# Patient Record
Sex: FEMALE | Race: WHITE
[De-identification: names, ages, dates, MRNs, and addresses within clinical notes are randomized per-mention and may not be internally consistent; named-entity substitution may affect disease eponyms.]

---

## 2023-06-21 ENCOUNTER — HOSPITAL ENCOUNTER
Dept: HOSPITAL 101 - LAB | Age: 83
End: 2023-06-21
Payer: MEDICARE

## 2023-06-21 DIAGNOSIS — R00.1: ICD-10-CM

## 2023-06-21 DIAGNOSIS — I10: ICD-10-CM

## 2023-06-21 DIAGNOSIS — D64.9: ICD-10-CM

## 2023-06-21 DIAGNOSIS — R06.02: Primary | ICD-10-CM

## 2023-06-21 DIAGNOSIS — I25.10: ICD-10-CM

## 2023-06-21 LAB
ADD MANUAL DIFF: NO
ANION GAP: 12.3
BLOOD UREA NITROGEN: 21 MG/DL (ref 7–18)
CALCIUM: 9.1 MG/DL (ref 8.5–10.1)
CARBON DIOXIDE: 29.4 MMOL/L (ref 21–32)
CHLORIDE: 105 MMOL/L (ref 98–107)
CO2 BLD-SCNC: 29.4 MMOL/L (ref 21–32)
ESTIMATED GFR (AFRICAN AMERICA: 53 (ref 60–?)
ESTIMATED GFR (NON-AFRICAN AME: 44 (ref 60–?)
GLUCOSE BLD-MCNC: 94 MG/DL (ref 74–106)
HCT VFR BLD CALC: 32 % (ref 36–48)
HEMATOCRIT: 32 % (ref 36–48)
HEMOGLOBIN: 10.6 G/DL (ref 12–16)
IMMATURE GRANULOCYTES ABS AUTO: 0.05 10^3/UL (ref 0–0.03)
IMMATURE GRANULOCYTES PCT AUTO: 0.9 % (ref 0–0.5)
LYMPHOCYTES  ABSOLUTE AUTO: 0.7 10^3/UL (ref 1.2–3.8)
MCV RBC: 89.4 FL (ref 81–99)
MEAN CORPUSCULAR HEMOGLOBIN: 29.6 PG (ref 26.7–34)
MEAN CORPUSCULAR HGB CONC: 33.1 G/DL (ref 29.9–35.2)
MEAN CORPUSCULAR VOLUME: 89.4 FL (ref 81–99)
NT PRO B TYPE NATRIURETIC PEPT: 746 PG/ML (ref ?–1800)
PLATELET # BLD: 314 10^3/UL (ref 150–450)
PLATELET COUNT: 314 10^3/UL (ref 150–450)
POTASSIUM SERPLBLD-SCNC: 4.7 MMOL/L (ref 3.5–5.1)
POTASSIUM: 4.7 MMOL/L (ref 3.5–5.1)
RED BLOOD COUNT: 3.58 10^6/UL (ref 4.2–5.4)
SODIUM BLD-SCNC: 142 MMOL/L (ref 136–145)
SODIUM: 142 MMOL/L (ref 136–145)
THYROID STIMULATING HORMONE: 4.32 UIU/ML (ref 0.36–3.74)
WBC # BLD: 5.7 10^3/UL (ref 4–11)
WHITE BLOOD COUNT: 5.7 10^3/UL (ref 4–11)

## 2023-06-21 PROCEDURE — 84443 ASSAY THYROID STIM HORMONE: CPT

## 2023-06-21 PROCEDURE — 80048 BASIC METABOLIC PNL TOTAL CA: CPT

## 2023-06-21 PROCEDURE — 83880 ASSAY OF NATRIURETIC PEPTIDE: CPT

## 2023-06-21 PROCEDURE — 85025 COMPLETE CBC W/AUTO DIFF WBC: CPT

## 2023-06-21 PROCEDURE — 71046 X-RAY EXAM CHEST 2 VIEWS: CPT

## 2023-06-21 PROCEDURE — 36415 COLL VENOUS BLD VENIPUNCTURE: CPT

## 2023-08-03 ENCOUNTER — HOSPITAL ENCOUNTER
Dept: HOSPITAL 101 - LAB | Age: 83
Discharge: HOME | End: 2023-08-03
Payer: MEDICARE

## 2023-08-03 DIAGNOSIS — I10: Primary | ICD-10-CM

## 2023-08-03 LAB
ANION GAP: 11.8
BLOOD UREA NITROGEN: 17 MG/DL (ref 7–18)
CALCIUM: 8.7 MG/DL (ref 8.5–10.1)
CARBON DIOXIDE: 27.7 MMOL/L (ref 21–32)
CHLORIDE: 105 MMOL/L (ref 98–107)
CO2 BLD-SCNC: 27.7 MMOL/L (ref 21–32)
ESTIMATED GFR (AFRICAN AMERICA: 58 (ref 60–?)
ESTIMATED GFR (NON-AFRICAN AME: 48 (ref 60–?)
GLUCOSE BLD-MCNC: 117 MG/DL (ref 74–106)
POTASSIUM SERPLBLD-SCNC: 4.5 MMOL/L (ref 3.5–5.1)
POTASSIUM: 4.5 MMOL/L (ref 3.5–5.1)
SODIUM BLD-SCNC: 140 MMOL/L (ref 136–145)
SODIUM: 140 MMOL/L (ref 136–145)

## 2023-08-03 PROCEDURE — 80048 BASIC METABOLIC PNL TOTAL CA: CPT

## 2023-08-03 PROCEDURE — 36415 COLL VENOUS BLD VENIPUNCTURE: CPT

## 2023-09-20 ENCOUNTER — HOSPITAL ENCOUNTER
Dept: HOSPITAL 101 - LAB | Age: 83
Discharge: HOME | End: 2023-09-20
Payer: MEDICARE

## 2023-09-20 DIAGNOSIS — R30.0: Primary | ICD-10-CM

## 2023-09-20 LAB
CAST SEEN?: (no result) #/LPF
GLUCOSE URINE UA: NEGATIVE MG/DL

## 2023-09-20 PROCEDURE — 81001 URINALYSIS AUTO W/SCOPE: CPT

## 2023-09-20 PROCEDURE — 87086 URINE CULTURE/COLONY COUNT: CPT

## 2023-09-29 ENCOUNTER — HOSPITAL ENCOUNTER (INPATIENT)
Dept: HOSPITAL 101 - ER | Age: 83
LOS: 1 days | Discharge: HOME | DRG: 177 | End: 2023-09-30
Payer: MEDICARE

## 2023-09-29 VITALS — DIASTOLIC BLOOD PRESSURE: 100 MMHG | SYSTOLIC BLOOD PRESSURE: 166 MMHG

## 2023-09-29 VITALS — HEART RATE: 63 BPM

## 2023-09-29 VITALS
DIASTOLIC BLOOD PRESSURE: 68 MMHG | OXYGEN SATURATION: 85 % | HEART RATE: 68 BPM | RESPIRATION RATE: 32 BRPM | SYSTOLIC BLOOD PRESSURE: 148 MMHG | TEMPERATURE: 98.3 F

## 2023-09-29 VITALS
RESPIRATION RATE: 20 BRPM | HEART RATE: 69 BPM | OXYGEN SATURATION: 95 % | SYSTOLIC BLOOD PRESSURE: 166 MMHG | TEMPERATURE: 99.5 F | DIASTOLIC BLOOD PRESSURE: 74 MMHG

## 2023-09-29 VITALS — OXYGEN SATURATION: 97 % | HEART RATE: 85 BPM

## 2023-09-29 VITALS
TEMPERATURE: 97 F | DIASTOLIC BLOOD PRESSURE: 55 MMHG | HEART RATE: 63 BPM | SYSTOLIC BLOOD PRESSURE: 154 MMHG | RESPIRATION RATE: 16 BRPM | OXYGEN SATURATION: 92 %

## 2023-09-29 VITALS
TEMPERATURE: 96.7 F | DIASTOLIC BLOOD PRESSURE: 50 MMHG | HEART RATE: 62 BPM | OXYGEN SATURATION: 96 % | SYSTOLIC BLOOD PRESSURE: 101 MMHG | RESPIRATION RATE: 18 BRPM

## 2023-09-29 VITALS — HEART RATE: 82 BPM | OXYGEN SATURATION: 98 %

## 2023-09-29 VITALS — HEART RATE: 79 BPM

## 2023-09-29 VITALS
DIASTOLIC BLOOD PRESSURE: 58 MMHG | SYSTOLIC BLOOD PRESSURE: 146 MMHG | HEART RATE: 82 BPM | TEMPERATURE: 97.16 F | RESPIRATION RATE: 20 BRPM | OXYGEN SATURATION: 100 %

## 2023-09-29 VITALS — BODY MASS INDEX: 2 KG/M2 | BODY MASS INDEX: 35.4 KG/M2

## 2023-09-29 VITALS — OXYGEN SATURATION: 97 %

## 2023-09-29 VITALS
DIASTOLIC BLOOD PRESSURE: 68 MMHG | RESPIRATION RATE: 18 BRPM | SYSTOLIC BLOOD PRESSURE: 172 MMHG | TEMPERATURE: 101.9 F | OXYGEN SATURATION: 97 % | HEART RATE: 69 BPM

## 2023-09-29 VITALS — RESPIRATION RATE: 18 BRPM | OXYGEN SATURATION: 95 % | HEART RATE: 75 BPM

## 2023-09-29 VITALS — OXYGEN SATURATION: 96 %

## 2023-09-29 VITALS — HEART RATE: 61 BPM | OXYGEN SATURATION: 92 %

## 2023-09-29 VITALS — HEART RATE: 72 BPM

## 2023-09-29 VITALS — OXYGEN SATURATION: 87 %

## 2023-09-29 VITALS — HEART RATE: 74 BPM | RESPIRATION RATE: 27 BRPM | OXYGEN SATURATION: 94 %

## 2023-09-29 VITALS — OXYGEN SATURATION: 93 %

## 2023-09-29 VITALS — HEART RATE: 44 BPM

## 2023-09-29 VITALS — OXYGEN SATURATION: 88 %

## 2023-09-29 VITALS — HEART RATE: 68 BPM

## 2023-09-29 VITALS — HEART RATE: 74 BPM | OXYGEN SATURATION: 91 %

## 2023-09-29 DIAGNOSIS — Z66: ICD-10-CM

## 2023-09-29 DIAGNOSIS — J12.82: ICD-10-CM

## 2023-09-29 DIAGNOSIS — J15.9: ICD-10-CM

## 2023-09-29 DIAGNOSIS — Z86.16: ICD-10-CM

## 2023-09-29 DIAGNOSIS — I11.0: ICD-10-CM

## 2023-09-29 DIAGNOSIS — E66.9: ICD-10-CM

## 2023-09-29 DIAGNOSIS — Z80.9: ICD-10-CM

## 2023-09-29 DIAGNOSIS — Z79.82: ICD-10-CM

## 2023-09-29 DIAGNOSIS — Z95.5: ICD-10-CM

## 2023-09-29 DIAGNOSIS — Z88.2: ICD-10-CM

## 2023-09-29 DIAGNOSIS — Z88.7: ICD-10-CM

## 2023-09-29 DIAGNOSIS — Z88.8: ICD-10-CM

## 2023-09-29 DIAGNOSIS — I25.10: ICD-10-CM

## 2023-09-29 DIAGNOSIS — Z79.899: ICD-10-CM

## 2023-09-29 DIAGNOSIS — Z79.02: ICD-10-CM

## 2023-09-29 DIAGNOSIS — J96.01: ICD-10-CM

## 2023-09-29 DIAGNOSIS — Z88.1: ICD-10-CM

## 2023-09-29 DIAGNOSIS — K21.9: ICD-10-CM

## 2023-09-29 DIAGNOSIS — Z83.3: ICD-10-CM

## 2023-09-29 DIAGNOSIS — U07.1: Primary | ICD-10-CM

## 2023-09-29 DIAGNOSIS — I50.33: ICD-10-CM

## 2023-09-29 LAB
ADD MANUAL DIFF: NO
ALANINE AMINOTRANSFERASE: 19 U/L (ref 14–59)
ALBUMIN GLOBULIN RATIO: 0.9
ALBUMIN LEVEL: 3.5 G/DL (ref 3.4–5)
ALKALINE PHOSPHATASE: 46 U/L (ref 46–116)
ANION GAP: 13.7
ASPARTATE AMINO TRANSFERASE: <5 U/L (ref 15–37)
BLOOD UREA NITROGEN: 18 MG/DL (ref 7–18)
CALCIUM: 8.8 MG/DL (ref 8.5–10.1)
CARBON DIOXIDE: 29.4 MMOL/L (ref 21–32)
CHLORIDE: 103 MMOL/L (ref 98–107)
CO2 BLD-SCNC: 29.4 MMOL/L (ref 21–32)
ESTIMATED GFR (AFRICAN AMERICA: 56 (ref 60–?)
ESTIMATED GFR (NON-AFRICAN AME: 46 (ref 60–?)
GLOBULIN: 3.7 G/DL
GLUCOSE BLD-MCNC: 118 MG/DL (ref 74–106)
HCT VFR BLD CALC: 30.9 % (ref 36–48)
HEMATOCRIT: 30.9 % (ref 36–48)
HEMOGLOBIN: 10 G/DL (ref 12–16)
IMMATURE GRANULOCYTES ABS AUTO: 0.08 10^3/UL (ref 0–0.03)
IMMATURE GRANULOCYTES PCT AUTO: 0.6 % (ref 0–0.5)
LACTATE/LACTIC ACID: 1.6 MMOL/L (ref 0.4–2)
LYMPHOCYTES  ABSOLUTE AUTO: 0.7 10^3/UL (ref 1.2–3.8)
MCV RBC: 91.4 FL (ref 81–99)
MEAN CORPUSCULAR HEMOGLOBIN: 29.6 PG (ref 26.7–34)
MEAN CORPUSCULAR HGB CONC: 32.4 G/DL (ref 29.9–35.2)
MEAN CORPUSCULAR VOLUME: 91.4 FL (ref 81–99)
NT PRO B TYPE NATRIURETIC PEPT: 1391 PG/ML (ref ?–1800)
PLATELET # BLD: 289 10^3/UL (ref 150–450)
PLATELET COUNT: 289 10^3/UL (ref 150–450)
POTASSIUM SERPLBLD-SCNC: 5.1 MMOL/L (ref 3.5–5.1)
POTASSIUM: 5.1 MMOL/L (ref 3.5–5.1)
RED BLOOD COUNT: 3.38 10^6/UL (ref 4.2–5.4)
SARS-COV-2 AG: NEGATIVE
SARS-COV-2 NAA: NOT DETECTED
SODIUM BLD-SCNC: 141 MMOL/L (ref 136–145)
SODIUM: 141 MMOL/L (ref 136–145)
TOTAL PROTEIN: 7.2 G/DL (ref 6.4–8.2)
WBC # BLD: 13.7 10^3/UL (ref 4–11)
WHITE BLOOD COUNT: 13.7 10^3/UL (ref 4–11)

## 2023-09-29 PROCEDURE — 94667 MNPJ CHEST WALL 1ST: CPT

## 2023-09-29 PROCEDURE — 94668 MNPJ CHEST WALL SBSQ: CPT

## 2023-09-29 PROCEDURE — 96367 TX/PROPH/DG ADDL SEQ IV INF: CPT

## 2023-09-29 PROCEDURE — 87811 SARS-COV-2 COVID19 W/OPTIC: CPT

## 2023-09-29 PROCEDURE — 80053 COMPREHEN METABOLIC PANEL: CPT

## 2023-09-29 PROCEDURE — 99285 EMERGENCY DEPT VISIT HI MDM: CPT

## 2023-09-29 PROCEDURE — 71045 X-RAY EXAM CHEST 1 VIEW: CPT

## 2023-09-29 PROCEDURE — 94761 N-INVAS EAR/PLS OXIMETRY MLT: CPT

## 2023-09-29 PROCEDURE — 84484 ASSAY OF TROPONIN QUANT: CPT

## 2023-09-29 PROCEDURE — 96368 THER/DIAG CONCURRENT INF: CPT

## 2023-09-29 PROCEDURE — 80048 BASIC METABOLIC PNL TOTAL CA: CPT

## 2023-09-29 PROCEDURE — 87635 SARS-COV-2 COVID-19 AMP PRB: CPT

## 2023-09-29 PROCEDURE — 96375 TX/PRO/DX INJ NEW DRUG ADDON: CPT

## 2023-09-29 PROCEDURE — 85025 COMPLETE CBC W/AUTO DIFF WBC: CPT

## 2023-09-29 PROCEDURE — 97165 OT EVAL LOW COMPLEX 30 MIN: CPT

## 2023-09-29 PROCEDURE — 36415 COLL VENOUS BLD VENIPUNCTURE: CPT

## 2023-09-29 PROCEDURE — 96366 THER/PROPH/DIAG IV INF ADDON: CPT

## 2023-09-29 PROCEDURE — 83605 ASSAY OF LACTIC ACID: CPT

## 2023-09-29 PROCEDURE — 97112 NEUROMUSCULAR REEDUCATION: CPT

## 2023-09-29 PROCEDURE — U0003 INFECTIOUS AGENT DETECTION BY NUCLEIC ACID (DNA OR RNA); SEVERE ACUTE RESPIRATORY SYNDROME CORONAVIRUS 2 (SARS-COV-2) (CORONAVIRUS DISEASE [COVID-19]), AMPLIFIED PROBE TECHNIQUE, MAKING USE OF HIGH THROUGHPUT TECHNOLOGIES AS DESCRIBED BY CMS-2020-01-R: HCPCS

## 2023-09-29 PROCEDURE — 94640 AIRWAY INHALATION TREATMENT: CPT

## 2023-09-29 PROCEDURE — 83880 ASSAY OF NATRIURETIC PEPTIDE: CPT

## 2023-09-29 PROCEDURE — 97162 PT EVAL MOD COMPLEX 30 MIN: CPT

## 2023-09-29 PROCEDURE — 93005 ELECTROCARDIOGRAM TRACING: CPT

## 2023-09-29 PROCEDURE — 96365 THER/PROPH/DIAG IV INF INIT: CPT

## 2023-09-29 PROCEDURE — 97530 THERAPEUTIC ACTIVITIES: CPT

## 2023-09-29 PROCEDURE — 96376 TX/PRO/DX INJ SAME DRUG ADON: CPT

## 2023-09-29 RX ADMIN — ISOSORBIDE DINITRATE 30 MG: 30 TABLET ORAL at 13:14

## 2023-09-29 RX ADMIN — TICAGRELOR 90 MG: 90 TABLET ORAL at 21:07

## 2023-09-29 RX ADMIN — BUDESONIDE INHALATION 0.5 MG: 0.5 SUSPENSION RESPIRATORY (INHALATION) at 11:24

## 2023-09-29 RX ADMIN — OXYCODONE HYDROCHLORIDE AND ACETAMINOPHEN 500 MG: 500 TABLET ORAL at 05:39

## 2023-09-29 RX ADMIN — POTASSIUM CHLORIDE 10 MEQ: 750 TABLET, EXTENDED RELEASE ORAL at 11:12

## 2023-09-29 RX ADMIN — ALPRAZOLAM 0.25 MG: 0.25 TABLET ORAL at 13:14

## 2023-09-29 RX ADMIN — SODIUM CHLORIDE 10 ML: 900 INJECTION, SOLUTION INTRAVENOUS at 08:44

## 2023-09-29 RX ADMIN — HYDRALAZINE HYDROCHLORIDE 10 MG: 20 INJECTION, SOLUTION INTRAMUSCULAR; INTRAVENOUS at 05:39

## 2023-09-29 RX ADMIN — DIPHENHYDRAMINE HYDROCHLORIDE 12.5 MG: 50 INJECTION INTRAMUSCULAR; INTRAVENOUS at 02:37

## 2023-09-29 RX ADMIN — FUROSEMIDE 40 MG: 10 INJECTION, SOLUTION INTRAVENOUS at 08:43

## 2023-09-29 RX ADMIN — CLOPIDOGREL BISULFATE 75 MG: 75 TABLET ORAL at 11:12

## 2023-09-29 RX ADMIN — BUDESONIDE INHALATION 0.5 MG: 0.5 SUSPENSION RESPIRATORY (INHALATION) at 20:30

## 2023-09-29 RX ADMIN — AZITHROMYCIN 250 MG: 500 INJECTION, POWDER, LYOPHILIZED, FOR SOLUTION INTRAVENOUS at 08:43

## 2023-09-29 RX ADMIN — ATORVASTATIN CALCIUM 10 MG: 10 TABLET, FILM COATED ORAL at 21:08

## 2023-09-29 RX ADMIN — ALBUTEROL SULFATE 6 PUFF: 108 AEROSOL, METERED RESPIRATORY (INHALATION) at 02:37

## 2023-09-29 RX ADMIN — CALCIUM CARBONATE (ANTACID) CHEW TAB 500 MG 500 MG: 500 CHEW TAB at 05:39

## 2023-09-29 RX ADMIN — ASPIRIN 81 MG: 81 TABLET ORAL at 11:12

## 2023-09-29 RX ADMIN — ISOSORBIDE DINITRATE 30 MG: 30 TABLET ORAL at 08:43

## 2023-09-29 RX ADMIN — SODIUM CHLORIDE 125 ML: 900 INJECTION, SOLUTION INTRAVENOUS at 02:37

## 2023-09-29 RX ADMIN — ALPRAZOLAM 0.25 MG: 0.25 TABLET ORAL at 21:07

## 2023-09-29 RX ADMIN — FUROSEMIDE 40 MG: 10 INJECTION, SOLUTION INTRAMUSCULAR; INTRAVENOUS at 02:56

## 2023-09-29 RX ADMIN — GABAPENTIN 100 MG: 100 CAPSULE ORAL at 11:12

## 2023-09-29 RX ADMIN — ACETAMINOPHEN 650 MG: 325 TABLET ORAL at 05:39

## 2023-09-29 RX ADMIN — OMEPRAZOLE 40 MG: 40 CAPSULE, DELAYED RELEASE ORAL at 11:12

## 2023-09-29 RX ADMIN — GABAPENTIN 100 MG: 100 CAPSULE ORAL at 21:07

## 2023-09-29 RX ADMIN — LOSARTAN POTASSIUM 50 MG: 50 TABLET, FILM COATED ORAL at 21:07

## 2023-09-29 RX ADMIN — METOCLOPRAMIDE 10 MG: 5 INJECTION, SOLUTION INTRAMUSCULAR; INTRAVENOUS at 02:36

## 2023-09-29 RX ADMIN — ALBUTEROL SULFATE 2.5 MG: 2.5 SOLUTION RESPIRATORY (INHALATION) at 11:24

## 2023-09-29 RX ADMIN — DEXAMETHASONE SODIUM PHOSPHATE 10 MG: 10 INJECTION INTRAMUSCULAR; INTRAVENOUS at 02:37

## 2023-09-29 RX ADMIN — ALBUTEROL SULFATE 2.5 MG: 2.5 SOLUTION RESPIRATORY (INHALATION) at 15:53

## 2023-09-29 RX ADMIN — ALBUTEROL SULFATE 2.5 MG: 2.5 SOLUTION RESPIRATORY (INHALATION) at 20:30

## 2023-09-30 VITALS — OXYGEN SATURATION: 95 % | HEART RATE: 60 BPM | RESPIRATION RATE: 16 BRPM

## 2023-09-30 VITALS — RESPIRATION RATE: 16 BRPM

## 2023-09-30 VITALS
OXYGEN SATURATION: 91 % | TEMPERATURE: 97 F | RESPIRATION RATE: 20 BRPM | SYSTOLIC BLOOD PRESSURE: 144 MMHG | HEART RATE: 95 BPM | DIASTOLIC BLOOD PRESSURE: 60 MMHG

## 2023-09-30 VITALS — DIASTOLIC BLOOD PRESSURE: 77 MMHG | SYSTOLIC BLOOD PRESSURE: 142 MMHG

## 2023-09-30 VITALS — OXYGEN SATURATION: 95 % | HEART RATE: 75 BPM

## 2023-09-30 VITALS — OXYGEN SATURATION: 97 % | HEART RATE: 56 BPM

## 2023-09-30 VITALS — HEART RATE: 83 BPM | RESPIRATION RATE: 18 BRPM | OXYGEN SATURATION: 97 %

## 2023-09-30 VITALS — SYSTOLIC BLOOD PRESSURE: 142 MMHG | DIASTOLIC BLOOD PRESSURE: 77 MMHG

## 2023-09-30 VITALS — OXYGEN SATURATION: 94 %

## 2023-09-30 VITALS — HEART RATE: 63 BPM

## 2023-09-30 VITALS — HEART RATE: 71 BPM

## 2023-09-30 VITALS — HEART RATE: 78 BPM

## 2023-09-30 LAB
ADD MANUAL DIFF: NO
ANION GAP: 14.7
BLOOD UREA NITROGEN: 31 MG/DL (ref 7–18)
CALCIUM: 8.4 MG/DL (ref 8.5–10.1)
CARBON DIOXIDE: 24.8 MMOL/L (ref 21–32)
CHLORIDE: 102 MMOL/L (ref 98–107)
CO2 BLD-SCNC: 24.8 MMOL/L (ref 21–32)
ESTIMATED GFR (AFRICAN AMERICA: 50 (ref 60–?)
ESTIMATED GFR (NON-AFRICAN AME: 42 (ref 60–?)
GLUCOSE BLD-MCNC: 154 MG/DL (ref 74–106)
HCT VFR BLD CALC: 25.3 % (ref 36–48)
HEMATOCRIT: 25.3 % (ref 36–48)
HEMOGLOBIN: 8.2 G/DL (ref 12–16)
IMMATURE GRANULOCYTES ABS AUTO: 0.09 10^3/UL (ref 0–0.03)
IMMATURE GRANULOCYTES PCT AUTO: 0.7 % (ref 0–0.5)
LYMPHOCYTES  ABSOLUTE AUTO: 0.6 10^3/UL (ref 1.2–3.8)
MCV RBC: 89.7 FL (ref 81–99)
MEAN CORPUSCULAR HEMOGLOBIN: 29.1 PG (ref 26.7–34)
MEAN CORPUSCULAR HGB CONC: 32.4 G/DL (ref 29.9–35.2)
MEAN CORPUSCULAR VOLUME: 89.7 FL (ref 81–99)
PLATELET # BLD: 261 10^3/UL (ref 150–450)
PLATELET COUNT: 261 10^3/UL (ref 150–450)
POTASSIUM SERPLBLD-SCNC: 4.5 MMOL/L (ref 3.5–5.1)
POTASSIUM: 4.5 MMOL/L (ref 3.5–5.1)
RED BLOOD COUNT: 2.82 10^6/UL (ref 4.2–5.4)
SODIUM BLD-SCNC: 137 MMOL/L (ref 136–145)
SODIUM: 137 MMOL/L (ref 136–145)
WBC # BLD: 12.5 10^3/UL (ref 4–11)
WHITE BLOOD COUNT: 12.5 10^3/UL (ref 4–11)

## 2023-09-30 RX ADMIN — ACETAMINOPHEN 650 MG: 325 TABLET ORAL at 07:31

## 2023-09-30 RX ADMIN — CLOPIDOGREL BISULFATE 75 MG: 75 TABLET ORAL at 09:13

## 2023-09-30 RX ADMIN — ASPIRIN 81 MG: 81 TABLET ORAL at 09:16

## 2023-09-30 RX ADMIN — OXYCODONE HYDROCHLORIDE AND ACETAMINOPHEN 500 MG: 500 TABLET ORAL at 09:16

## 2023-09-30 RX ADMIN — LOSARTAN POTASSIUM 50 MG: 50 TABLET, FILM COATED ORAL at 09:14

## 2023-09-30 RX ADMIN — ALBUTEROL SULFATE 2.5 MG: 2.5 SOLUTION RESPIRATORY (INHALATION) at 05:00

## 2023-09-30 RX ADMIN — ALPRAZOLAM 0.25 MG: 0.25 TABLET ORAL at 05:40

## 2023-09-30 RX ADMIN — GABAPENTIN 100 MG: 100 CAPSULE ORAL at 11:17

## 2023-09-30 RX ADMIN — ISOSORBIDE DINITRATE 30 MG: 30 TABLET ORAL at 09:12

## 2023-09-30 RX ADMIN — AZITHROMYCIN 250 MG: 500 INJECTION, POWDER, LYOPHILIZED, FOR SOLUTION INTRAVENOUS at 09:06

## 2023-09-30 RX ADMIN — FUROSEMIDE 40 MG: 10 INJECTION, SOLUTION INTRAVENOUS at 09:17

## 2023-09-30 RX ADMIN — OMEPRAZOLE 40 MG: 40 CAPSULE, DELAYED RELEASE ORAL at 05:40

## 2023-09-30 RX ADMIN — TICAGRELOR 90 MG: 90 TABLET ORAL at 11:17

## 2023-10-03 ENCOUNTER — HOSPITAL ENCOUNTER (EMERGENCY)
Age: 83
Discharge: HOME | End: 2023-10-03
Payer: MEDICARE

## 2023-10-03 VITALS
TEMPERATURE: 98.2 F | RESPIRATION RATE: 18 BRPM | DIASTOLIC BLOOD PRESSURE: 90 MMHG | SYSTOLIC BLOOD PRESSURE: 198 MMHG | HEART RATE: 60 BPM | OXYGEN SATURATION: 98 %

## 2023-10-03 VITALS — OXYGEN SATURATION: 98 %

## 2023-10-03 VITALS — BODY MASS INDEX: 32 KG/M2

## 2023-10-03 DIAGNOSIS — Z79.82: ICD-10-CM

## 2023-10-03 DIAGNOSIS — I25.10: ICD-10-CM

## 2023-10-03 DIAGNOSIS — Z79.899: ICD-10-CM

## 2023-10-03 DIAGNOSIS — I10: Primary | ICD-10-CM

## 2023-10-03 PROCEDURE — 99281 EMR DPT VST MAYX REQ PHY/QHP: CPT

## 2023-11-03 ENCOUNTER — HOSPITAL ENCOUNTER (OUTPATIENT)
Dept: HOSPITAL 101 - CR | Age: 83
LOS: 3 days | Discharge: HOME | End: 2023-11-06
Payer: MEDICARE

## 2023-11-03 DIAGNOSIS — Z98.61: Primary | ICD-10-CM

## 2023-11-03 PROCEDURE — 93798 PHYS/QHP OP CAR RHAB W/ECG: CPT

## 2023-11-03 PROCEDURE — 93797 PHYS/QHP OP CAR RHAB WO ECG: CPT

## 2023-11-28 ENCOUNTER — HOSPITAL ENCOUNTER (EMERGENCY)
Age: 83
Discharge: HOME | End: 2023-11-28
Payer: MEDICARE

## 2023-11-28 ENCOUNTER — HOSPITAL ENCOUNTER
Dept: HOSPITAL 101 - LAB | Age: 83
Discharge: HOME | End: 2023-11-28
Payer: MEDICARE

## 2023-11-28 VITALS
TEMPERATURE: 98.2 F | RESPIRATION RATE: 18 BRPM | HEART RATE: 70 BPM | OXYGEN SATURATION: 96 % | SYSTOLIC BLOOD PRESSURE: 143 MMHG | DIASTOLIC BLOOD PRESSURE: 58 MMHG

## 2023-11-28 VITALS — OXYGEN SATURATION: 91 % | HEART RATE: 52 BPM | RESPIRATION RATE: 15 BRPM

## 2023-11-28 VITALS — OXYGEN SATURATION: 93 % | HEART RATE: 66 BPM | RESPIRATION RATE: 21 BRPM

## 2023-11-28 VITALS — BODY MASS INDEX: 31.3 KG/M2

## 2023-11-28 VITALS
RESPIRATION RATE: 23 BRPM | DIASTOLIC BLOOD PRESSURE: 45 MMHG | OXYGEN SATURATION: 93 % | SYSTOLIC BLOOD PRESSURE: 147 MMHG | HEART RATE: 57 BPM

## 2023-11-28 VITALS — OXYGEN SATURATION: 98 % | HEART RATE: 60 BPM | RESPIRATION RATE: 20 BRPM

## 2023-11-28 VITALS — RESPIRATION RATE: 21 BRPM | OXYGEN SATURATION: 93 % | HEART RATE: 62 BPM

## 2023-11-28 VITALS — OXYGEN SATURATION: 92 % | HEART RATE: 55 BPM | RESPIRATION RATE: 21 BRPM

## 2023-11-28 VITALS — HEART RATE: 64 BPM | OXYGEN SATURATION: 94 %

## 2023-11-28 VITALS — OXYGEN SATURATION: 99 % | RESPIRATION RATE: 32 BRPM | HEART RATE: 69 BPM

## 2023-11-28 VITALS — OXYGEN SATURATION: 94 %

## 2023-11-28 DIAGNOSIS — E66.9: ICD-10-CM

## 2023-11-28 DIAGNOSIS — R30.0: ICD-10-CM

## 2023-11-28 DIAGNOSIS — I10: ICD-10-CM

## 2023-11-28 DIAGNOSIS — Z86.16: ICD-10-CM

## 2023-11-28 DIAGNOSIS — Z79.899: ICD-10-CM

## 2023-11-28 DIAGNOSIS — R05.2: Primary | ICD-10-CM

## 2023-11-28 DIAGNOSIS — R06.09: ICD-10-CM

## 2023-11-28 DIAGNOSIS — I25.10: ICD-10-CM

## 2023-11-28 DIAGNOSIS — K21.9: ICD-10-CM

## 2023-11-28 DIAGNOSIS — J06.9: Primary | ICD-10-CM

## 2023-11-28 DIAGNOSIS — Z79.82: ICD-10-CM

## 2023-11-28 DIAGNOSIS — R05.2: ICD-10-CM

## 2023-11-28 LAB
ADD MANUAL DIFF: NO
ADD MANUAL DIFF: YES
ANION GAP: 13.1
BAND NEUTROPHILS ABSOLUTE: 0.1 10^3/UL (ref 0–0.3)
BASOPHILS ABS MANUAL: 0 10^3/UL (ref 0–0.1)
BASOPHILS NFR SPEC MANUAL: 0 % (ref 0.2–2)
BLOOD UREA NITROGEN: 19 MG/DL (ref 7–18)
CALCIUM: 8.7 MG/DL (ref 8.5–10.1)
CARBON DIOXIDE: 28.6 MMOL/L (ref 21–32)
CAST SEEN?: (no result) #/LPF
CHLORIDE: 100 MMOL/L (ref 98–107)
CO2 BLD-SCNC: 28.6 MMOL/L (ref 21–32)
EOSINOPHILS ABSOLUTE MANUAL: 0.39 10^3/UL (ref 0–0.7)
EOSINOPHILS PERCENT MANUAL: 7 % (ref 0.9–7)
ESTIMATED GFR (AFRICAN AMERICA: 50 (ref 60–?)
ESTIMATED GFR (NON-AFRICAN AME: 41 (ref 60–?)
GLUCOSE BLD-MCNC: 110 MG/DL (ref 74–106)
GLUCOSE URINE UA: NEGATIVE MG/DL
HCT VFR BLD CALC: 29.9 % (ref 36–48)
HCT VFR BLD CALC: 30.7 % (ref 36–48)
HEMATOCRIT: 29.9 % (ref 36–48)
HEMATOCRIT: 30.7 % (ref 36–48)
HEMOGLOBIN: 9.7 G/DL (ref 12–16)
HEMOGLOBIN: 9.8 G/DL (ref 12–16)
IMMATURE GRANULOCYTES ABS AUTO: 0.05 10^3/UL (ref 0–0.03)
IMMATURE GRANULOCYTES PCT AUTO: 0.9 % (ref 0–0.5)
LYMPHOCYTES  ABSOLUTE AUTO: 0.6 10^3/UL (ref 1.2–3.8)
LYMPHOCYTES ABSOLUTE MANUAL: 0.56 10^3/UL (ref 1.2–3.8)
LYMPHOCYTES PERCENT MANUAL: 10 % (ref 20.5–60)
MCV RBC: 89.5 FL (ref 81–99)
MCV RBC: 90 FL (ref 81–99)
MEAN CORPUSCULAR HEMOGLOBIN: 28.7 PG (ref 26.7–34)
MEAN CORPUSCULAR HEMOGLOBIN: 29 PG (ref 26.7–34)
MEAN CORPUSCULAR HGB CONC: 31.9 G/DL (ref 29.9–35.2)
MEAN CORPUSCULAR HGB CONC: 32.4 G/DL (ref 29.9–35.2)
MEAN CORPUSCULAR VOLUME: 89.5 FL (ref 81–99)
MEAN CORPUSCULAR VOLUME: 90 FL (ref 81–99)
MONOCYTES ABSOLUTE MANUAL: 0.39 10^3/UL (ref 0.3–0.8)
MONOCYTES PERCENT MANUAL: 7 % (ref 1.7–12)
NT PRO B TYPE NATRIURETIC PEPT: 360 PG/ML (ref ?–1800)
PLATELET # BLD: 314 10^3/UL (ref 150–450)
PLATELET # BLD: 340 10^3/UL (ref 150–450)
PLATELET COUNT: 314 10^3/UL (ref 150–450)
PLATELET COUNT: 340 10^3/UL (ref 150–450)
POTASSIUM SERPLBLD-SCNC: 4.7 MMOL/L (ref 3.5–5.1)
POTASSIUM: 4.7 MMOL/L (ref 3.5–5.1)
RED BLOOD COUNT: 3.34 10^6/UL (ref 4.2–5.4)
RED BLOOD COUNT: 3.41 10^6/UL (ref 4.2–5.4)
SEGMENTED NEUT ABSOLUTE MANUAL: 4.2 10^3/UL (ref 1.4–6.5)
SEGMENTED NEUTROPHILS % MANUAL: 75
SODIUM BLD-SCNC: 137 MMOL/L (ref 136–145)
SODIUM: 137 MMOL/L (ref 136–145)
URINE CULTURE INDICATED: (no result)
WBC # BLD: 5.6 10^3/UL (ref 4–11)
WBC # BLD: 5.7 10^3/UL (ref 4–11)
WHITE BLOOD COUNT: 5.6 10^3/UL (ref 4–11)
WHITE BLOOD COUNT: 5.7 10^3/UL (ref 4–11)

## 2023-11-28 PROCEDURE — 80048 BASIC METABOLIC PNL TOTAL CA: CPT

## 2023-11-28 PROCEDURE — 81001 URINALYSIS AUTO W/SCOPE: CPT

## 2023-11-28 PROCEDURE — 93005 ELECTROCARDIOGRAM TRACING: CPT

## 2023-11-28 PROCEDURE — 85027 COMPLETE CBC AUTOMATED: CPT

## 2023-11-28 PROCEDURE — 87086 URINE CULTURE/COLONY COUNT: CPT

## 2023-11-28 PROCEDURE — 85378 FIBRIN DEGRADE SEMIQUANT: CPT

## 2023-11-28 PROCEDURE — 36415 COLL VENOUS BLD VENIPUNCTURE: CPT

## 2023-11-28 PROCEDURE — 83880 ASSAY OF NATRIURETIC PEPTIDE: CPT

## 2023-11-28 PROCEDURE — 85025 COMPLETE CBC W/AUTO DIFF WBC: CPT

## 2023-11-28 PROCEDURE — 71275 CT ANGIOGRAPHY CHEST: CPT

## 2023-11-28 PROCEDURE — 71046 X-RAY EXAM CHEST 2 VIEWS: CPT

## 2023-11-28 PROCEDURE — 99285 EMERGENCY DEPT VISIT HI MDM: CPT

## 2023-11-28 PROCEDURE — 84484 ASSAY OF TROPONIN QUANT: CPT

## 2023-12-01 ENCOUNTER — HOSPITAL ENCOUNTER (EMERGENCY)
Age: 83
Discharge: HOME | End: 2023-12-01
Payer: MEDICARE

## 2023-12-01 VITALS
DIASTOLIC BLOOD PRESSURE: 62 MMHG | SYSTOLIC BLOOD PRESSURE: 181 MMHG | OXYGEN SATURATION: 97 % | HEART RATE: 68 BPM | TEMPERATURE: 97.7 F | RESPIRATION RATE: 16 BRPM

## 2023-12-01 VITALS — HEART RATE: 62 BPM | OXYGEN SATURATION: 94 % | RESPIRATION RATE: 16 BRPM

## 2023-12-01 VITALS — BODY MASS INDEX: 31.2 KG/M2

## 2023-12-01 DIAGNOSIS — I10: ICD-10-CM

## 2023-12-01 DIAGNOSIS — E66.9: ICD-10-CM

## 2023-12-01 DIAGNOSIS — Z87.01: ICD-10-CM

## 2023-12-01 DIAGNOSIS — Z79.899: ICD-10-CM

## 2023-12-01 DIAGNOSIS — Z86.16: ICD-10-CM

## 2023-12-01 DIAGNOSIS — T78.40XA: Primary | ICD-10-CM

## 2023-12-01 DIAGNOSIS — I25.10: ICD-10-CM

## 2023-12-01 DIAGNOSIS — K21.9: ICD-10-CM

## 2023-12-01 DIAGNOSIS — Z79.82: ICD-10-CM

## 2023-12-01 PROCEDURE — 99284 EMERGENCY DEPT VISIT MOD MDM: CPT

## 2023-12-01 PROCEDURE — 96372 THER/PROPH/DIAG INJ SC/IM: CPT

## 2023-12-12 DIAGNOSIS — Z98.61 S/P PTCA (PERCUTANEOUS TRANSLUMINAL CORONARY ANGIOPLASTY): ICD-10-CM

## 2023-12-12 RX ORDER — GABAPENTIN 100 MG/1
100 CAPSULE ORAL 2 TIMES DAILY
COMMUNITY

## 2023-12-12 RX ORDER — CARVEDILOL 3.12 MG/1
3.12 TABLET ORAL
COMMUNITY
End: 2024-05-08 | Stop reason: ALTCHOICE

## 2023-12-12 RX ORDER — PANTOPRAZOLE SODIUM 40 MG/1
40 TABLET, DELAYED RELEASE ORAL
COMMUNITY

## 2023-12-12 RX ORDER — IRBESARTAN 150 MG/1
150 TABLET ORAL NIGHTLY
COMMUNITY
End: 2024-05-08 | Stop reason: SDUPTHER

## 2023-12-12 RX ORDER — CLOPIDOGREL BISULFATE 75 MG/1
75 TABLET ORAL DAILY
COMMUNITY
End: 2023-12-12 | Stop reason: SDUPTHER

## 2023-12-12 RX ORDER — ALPRAZOLAM 0.25 MG/1
0.25 TABLET, ORALLY DISINTEGRATING ORAL NIGHTLY PRN
COMMUNITY

## 2023-12-12 RX ORDER — PRAVASTATIN SODIUM 40 MG/1
40 TABLET ORAL NIGHTLY
COMMUNITY
End: 2024-05-08 | Stop reason: ALTCHOICE

## 2023-12-12 RX ORDER — CLOPIDOGREL BISULFATE 75 MG/1
75 TABLET ORAL DAILY
Qty: 90 TABLET | Refills: 3 | Status: SHIPPED | OUTPATIENT
Start: 2023-12-12 | End: 2024-12-11

## 2023-12-12 RX ORDER — TEMAZEPAM 15 MG/1
15 CAPSULE ORAL NIGHTLY PRN
COMMUNITY

## 2023-12-12 RX ORDER — ASPIRIN 81 MG/1
81 TABLET ORAL DAILY
COMMUNITY

## 2023-12-12 RX ORDER — FUROSEMIDE 20 MG/1
20 TABLET ORAL 2 TIMES DAILY
COMMUNITY

## 2023-12-12 RX ORDER — POTASSIUM CHLORIDE 750 MG/1
10 TABLET, FILM COATED, EXTENDED RELEASE ORAL 2 TIMES DAILY
COMMUNITY
End: 2024-05-09 | Stop reason: ALTCHOICE

## 2023-12-20 ENCOUNTER — OFFICE VISIT (OUTPATIENT)
Dept: CARDIOLOGY | Facility: CLINIC | Age: 83
End: 2023-12-20
Payer: MEDICARE

## 2023-12-20 VITALS
BODY MASS INDEX: 31.82 KG/M2 | HEART RATE: 60 BPM | SYSTOLIC BLOOD PRESSURE: 122 MMHG | HEIGHT: 65 IN | WEIGHT: 191 LBS | DIASTOLIC BLOOD PRESSURE: 70 MMHG

## 2023-12-20 DIAGNOSIS — Z87.891 FORMER SMOKER: ICD-10-CM

## 2023-12-20 DIAGNOSIS — Z95.5 S/P CORONARY ARTERY STENT PLACEMENT: ICD-10-CM

## 2023-12-20 DIAGNOSIS — I25.10 ASHD (ARTERIOSCLEROTIC HEART DISEASE): ICD-10-CM

## 2023-12-20 DIAGNOSIS — I10 ESSENTIAL HYPERTENSION: ICD-10-CM

## 2023-12-20 DIAGNOSIS — R06.02 SHORTNESS OF BREATH: ICD-10-CM

## 2023-12-20 DIAGNOSIS — U07.1 COVID: ICD-10-CM

## 2023-12-20 PROCEDURE — 3078F DIAST BP <80 MM HG: CPT | Performed by: INTERNAL MEDICINE

## 2023-12-20 PROCEDURE — 99214 OFFICE O/P EST MOD 30 MIN: CPT | Performed by: INTERNAL MEDICINE

## 2023-12-20 PROCEDURE — 1036F TOBACCO NON-USER: CPT | Performed by: INTERNAL MEDICINE

## 2023-12-20 PROCEDURE — 3074F SYST BP LT 130 MM HG: CPT | Performed by: INTERNAL MEDICINE

## 2023-12-20 PROCEDURE — 1160F RVW MEDS BY RX/DR IN RCRD: CPT | Performed by: INTERNAL MEDICINE

## 2023-12-20 PROCEDURE — 1159F MED LIST DOCD IN RCRD: CPT | Performed by: INTERNAL MEDICINE

## 2023-12-20 NOTE — PROGRESS NOTES
"Subjective   Adele Wild is a 83 y.o. female       Chief Complaint    Follow-up          83-year-old female returns for follow-up, she has persistent exertional dyspnea and shortness of breath following COVID illness in September and has been diagnosed apparently with long COVID syndrome.  She has a history of PCI's of the RCA and diagonal branch in May of this year.  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         Review of Systems   All other systems reviewed and are negative.         Visit Vitals  /70 (BP Location: Right arm, Patient Position: Sitting)   Pulse 60   Ht 1.651 m (5' 5\")   Wt 86.6 kg (191 lb)   BMI 31.78 kg/m²   Smoking Status Former   BSA 1.99 m²        Objective   Physical Exam  Constitutional:       Appearance: Normal appearance. She is normal weight.   HENT:      Nose: Nose normal.   Neck:      Vascular: No carotid bruit.   Cardiovascular:      Rate and Rhythm: Normal rate.      Pulses: Normal pulses.      Heart sounds: Normal heart sounds. "   Pulmonary:      Effort: Pulmonary effort is normal.   Abdominal:      General: Bowel sounds are normal.      Palpations: Abdomen is soft.   Genitourinary:     Rectum: Normal.   Musculoskeletal:         General: Normal range of motion.      Cervical back: Normal range of motion.      Right lower leg: No edema.      Left lower leg: No edema.   Skin:     General: Skin is warm and dry.   Neurological:      General: No focal deficit present.      Mental Status: She is alert.   Psychiatric:         Mood and Affect: Mood normal.         Behavior: Behavior normal.         Thought Content: Thought content normal.         Judgment: Judgment normal.         Current Medications    Current Outpatient Medications:     ALPRAZolam (Niravam) 0.25 mg disintegrating tablet, Take 1 tablet (0.25 mg) by mouth as needed at bedtime for anxiety., Disp: , Rfl:     aspirin 81 mg EC tablet, Take 1 tablet (81 mg) by mouth once daily., Disp: , Rfl:     carvedilol (Coreg) 3.125 mg tablet, Take 1 tablet (3.125 mg) by mouth 2 times a day with meals., Disp: , Rfl:     clopidogrel (Plavix) 75 mg tablet, Take 1 tablet (75 mg) by mouth once daily., Disp: 90 tablet, Rfl: 3    furosemide (Lasix) 20 mg tablet, Take 1 tablet (20 mg) by mouth 2 times a day., Disp: , Rfl:     gabapentin (Neurontin) 100 mg capsule, Take 1 capsule (100 mg) by mouth 2 times a day., Disp: , Rfl:     irbesartan (Avapro) 150 mg tablet, Take 1 tablet (150 mg) by mouth once daily at bedtime., Disp: , Rfl:     pantoprazole (ProtoNix) 40 mg EC tablet, Take 1 tablet (40 mg) by mouth once daily in the morning. Take before meals. Do not crush, chew, or split., Disp: , Rfl:     potassium chloride CR 10 mEq ER tablet, Take 1 tablet (10 mEq) by mouth 2 times a day. Do not crush, chew, or split., Disp: , Rfl:     pravastatin (Pravachol) 40 mg tablet, Take 1 tablet (40 mg) by mouth once daily at bedtime., Disp: , Rfl:     temazepam (Restoril) 15 mg capsule, Take 1 capsule (15 mg) by mouth  as needed at bedtime for sleep., Disp: , Rfl:                      Assessment/Plan   1. ASHD (arteriosclerotic heart disease)        2. Essential hypertension        3. Shortness of breath        4. COVID        5. S/P coronary artery stent placement        6. Former smoker

## 2023-12-20 NOTE — LETTER
December 20, 2023     Nabil Hayden DO  [address]  [place] [address]    Patient: Adele Wild   YOB: 1940   Date of Visit: 12/20/2023       Dear Dr. Nabil Hayden DO:    Thank you for referring Adele Wild to me for evaluation. Below are my notes for this consultation.  If you have questions, please do not hesitate to call me. I look forward to following your patient along with you.       Sincerely,     Martínez Peters DO      CC: No Recipients  ______________________________________________________________________________________    Subjective   Adele Wild is a 83 y.o. female       Chief Complaint    Follow-up          83-year-old female returns for follow-up, she has persistent exertional dyspnea and shortness of breath following COVID illness in September and has been diagnosed apparently with long COVID syndrome.  She has a history of PCI's of the RCA and diagonal branch in May of this year.  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         Review of Systems   All other systems reviewed and are negative.         Visit Vitals  /70 (BP Location: Right arm, Patient Position: Sitting)   Pulse 60   Ht 1.651 m (5' 5\")   Wt 86.6 kg (191 lb)   BMI 31.78 kg/m²   Smoking Status Former   BSA 1.99 m²        Objective   Physical Exam  Constitutional:       Appearance: Normal appearance. She is normal weight.   HENT:      Nose: Nose normal.   Neck:      Vascular: No carotid bruit.   Cardiovascular:      Rate and Rhythm: Normal rate.      Pulses: Normal pulses.      Heart sounds: Normal heart sounds.   Pulmonary:      Effort: Pulmonary effort is normal.   Abdominal:      General: Bowel sounds are normal.      Palpations: Abdomen is soft.   Genitourinary:     Rectum: Normal.   Musculoskeletal:         General: Normal range of motion.      Cervical back: Normal range of motion.      Right lower leg: No edema.      Left lower leg: No edema.   Skin:     General: Skin is warm and dry.   Neurological:      General: No focal deficit present.      Mental Status: She is alert.   Psychiatric:         Mood and Affect: Mood normal.         Behavior: Behavior normal.         Thought Content: Thought content normal.         Judgment: Judgment normal.         Current Medications    Current Outpatient Medications:   •  ALPRAZolam (Niravam) 0.25 mg disintegrating tablet, Take 1 tablet (0.25 mg) by mouth as needed at bedtime for anxiety., Disp: , Rfl:   •  aspirin 81 mg EC tablet, Take 1 tablet (81 mg) by mouth once daily., Disp: , Rfl:   •  carvedilol (Coreg) 3.125 mg tablet, Take 1 tablet (3.125 mg) by mouth 2 times a day with meals., Disp: , Rfl:   •  clopidogrel (Plavix) 75 mg tablet, Take 1 tablet (75 mg) by mouth once daily., Disp: 90 tablet, Rfl: 3  •  furosemide (Lasix) 20 mg tablet, Take 1 tablet (20 mg) by mouth 2 times a day., Disp: , Rfl:   •  gabapentin (Neurontin) 100 mg capsule, Take 1 capsule (100 mg) by mouth 2 times a day., " Disp: , Rfl:   •  irbesartan (Avapro) 150 mg tablet, Take 1 tablet (150 mg) by mouth once daily at bedtime., Disp: , Rfl:   •  pantoprazole (ProtoNix) 40 mg EC tablet, Take 1 tablet (40 mg) by mouth once daily in the morning. Take before meals. Do not crush, chew, or split., Disp: , Rfl:   •  potassium chloride CR 10 mEq ER tablet, Take 1 tablet (10 mEq) by mouth 2 times a day. Do not crush, chew, or split., Disp: , Rfl:   •  pravastatin (Pravachol) 40 mg tablet, Take 1 tablet (40 mg) by mouth once daily at bedtime., Disp: , Rfl:   •  temazepam (Restoril) 15 mg capsule, Take 1 capsule (15 mg) by mouth as needed at bedtime for sleep., Disp: , Rfl:                      Assessment/Plan   1. ASHD (arteriosclerotic heart disease)        2. Essential hypertension        3. Shortness of breath        4. COVID        5. S/P coronary artery stent placement        6. Former smoker

## 2023-12-26 ENCOUNTER — HOSPITAL ENCOUNTER
Dept: HOSPITAL 101 - LAB | Age: 83
Discharge: HOME | End: 2023-12-26
Payer: MEDICARE

## 2023-12-26 DIAGNOSIS — I10: ICD-10-CM

## 2023-12-26 DIAGNOSIS — I25.10: Primary | ICD-10-CM

## 2023-12-26 LAB
ANION GAP: 9.3
BLOOD UREA NITROGEN: 30 MG/DL (ref 7–18)
CALCIUM: 9.3 MG/DL (ref 8.5–10.1)
CARBON DIOXIDE: 29.5 MMOL/L (ref 21–32)
CHLORIDE: 105 MMOL/L (ref 98–107)
CO2 BLD-SCNC: 29.5 MMOL/L (ref 21–32)
ESTIMATED GFR (AFRICAN AMERICA: 47 (ref 60–?)
ESTIMATED GFR (NON-AFRICAN AME: 39 (ref 60–?)
GLUCOSE BLD-MCNC: 103 MG/DL (ref 74–106)
POTASSIUM SERPLBLD-SCNC: 4.8 MMOL/L (ref 3.5–5.1)
POTASSIUM: 4.8 MMOL/L (ref 3.5–5.1)
SODIUM BLD-SCNC: 139 MMOL/L (ref 136–145)
SODIUM: 139 MMOL/L (ref 136–145)

## 2023-12-26 PROCEDURE — 80048 BASIC METABOLIC PNL TOTAL CA: CPT

## 2023-12-26 PROCEDURE — 36415 COLL VENOUS BLD VENIPUNCTURE: CPT

## 2024-03-19 ENCOUNTER — HOSPITAL ENCOUNTER
Dept: HOSPITAL 101 - MAMMO | Age: 84
Discharge: HOME | End: 2024-03-19
Payer: MEDICARE

## 2024-03-19 DIAGNOSIS — Z80.0: ICD-10-CM

## 2024-03-19 DIAGNOSIS — Z80.52: ICD-10-CM

## 2024-03-19 DIAGNOSIS — Z80.7: ICD-10-CM

## 2024-03-19 DIAGNOSIS — Z12.31: Primary | ICD-10-CM

## 2024-03-19 DIAGNOSIS — Z80.1: ICD-10-CM

## 2024-03-19 DIAGNOSIS — R00.2: ICD-10-CM

## 2024-03-19 PROCEDURE — 77067 SCR MAMMO BI INCL CAD: CPT

## 2024-03-19 PROCEDURE — 77063 BREAST TOMOSYNTHESIS BI: CPT

## 2024-03-19 PROCEDURE — 93246 EXT ECG>7D<15D RECORDING: CPT

## 2024-04-06 ENCOUNTER — HOSPITAL ENCOUNTER
Dept: HOSPITAL 101 - LAB | Age: 84
Discharge: HOME | End: 2024-04-06
Payer: MEDICARE

## 2024-04-06 DIAGNOSIS — R53.83: ICD-10-CM

## 2024-04-06 DIAGNOSIS — I25.10: ICD-10-CM

## 2024-04-06 DIAGNOSIS — D64.9: Primary | ICD-10-CM

## 2024-04-06 DIAGNOSIS — E78.00: ICD-10-CM

## 2024-04-06 DIAGNOSIS — D47.2: ICD-10-CM

## 2024-04-06 DIAGNOSIS — I10: ICD-10-CM

## 2024-04-06 LAB
ADD MANUAL DIFF: NO
ALANINE AMINOTRANSFERASE: 13 U/L (ref 14–59)
ALBUMIN GLOBULIN RATIO: 1.1
ALBUMIN LEVEL: 3.4 G/DL (ref 3.4–5)
ALKALINE PHOSPHATASE: 52 U/L (ref 46–116)
ANION GAP: 13.1
ASPARTATE AMINO TRANSFERASE: 14 U/L (ref 15–37)
BLOOD UREA NITROGEN: 24 MG/DL (ref 7–18)
CALCIUM: 9.3 MG/DL (ref 8.5–10.1)
CARBON DIOXIDE: 29.8 MMOL/L (ref 21–32)
CHLORIDE: 109 MMOL/L (ref 98–107)
CHOLESTEROL: 169 MG/DL (ref ?–200)
ESTIMATED GFR (AFRICAN AMERICA: 41 (ref 60–?)
ESTIMATED GFR (NON-AFRICAN AME: 34 (ref 60–?)
GLOBULIN: 3 G/DL
GLUCOSE: 104 MG/DL (ref 74–106)
HDL CHOLESTEROL: 45 MG/DL (ref 40–60)
HEMATOCRIT: 34.7 % (ref 36–48)
HEMOGLOBIN: 10.9 G/DL (ref 12–16)
IMMATURE GRANULOCYTES ABS AUTO: 0.02 10^3/UL (ref 0–0.03)
IMMATURE GRANULOCYTES PCT AUTO: 0.4 % (ref 0–0.5)
LYMPHOCYTES  ABSOLUTE AUTO: 1.1 10^3/UL (ref 1.2–3.8)
MCV RBC: 94.8 FL (ref 81–99)
MEAN CORPUSCULAR HEMOGLOBIN: 29.8 PG (ref 26.7–34)
MEAN CORPUSCULAR HGB CONC: 31.4 G/DL (ref 29.9–35.2)
PLATELET COUNT: 253 10^3/UL (ref 150–450)
POTASSIUM: 4.9 MMOL/L (ref 3.5–5.1)
RED BLOOD COUNT: 3.66 10^6/UL (ref 4.2–5.4)
SODIUM: 147 MMOL/L (ref 136–145)
THYROID STIMULATING HORMONE: 3.61 UIU/ML (ref 0.36–3.74)
TOTAL PROTEIN: 6.4 G/DL (ref 6.4–8.2)
TRIGLYCERIDES: 52 MG/DL (ref ?–150)
VLDL CHOLESTEROL: 10.4 MG/DL
WHITE BLOOD COUNT: 5.6 10^3/UL (ref 4–11)

## 2024-04-06 PROCEDURE — 80061 LIPID PANEL: CPT

## 2024-04-06 PROCEDURE — 85025 COMPLETE CBC W/AUTO DIFF WBC: CPT

## 2024-04-06 PROCEDURE — 36415 COLL VENOUS BLD VENIPUNCTURE: CPT

## 2024-04-06 PROCEDURE — 84443 ASSAY THYROID STIM HORMONE: CPT

## 2024-04-06 PROCEDURE — 80053 COMPREHEN METABOLIC PANEL: CPT

## 2024-04-22 ENCOUNTER — HOSPITAL ENCOUNTER (INPATIENT)
Dept: HOSPITAL 101 - ER | Age: 84
LOS: 2 days | Discharge: HOME | DRG: 305 | End: 2024-04-24
Payer: MEDICARE

## 2024-04-22 ENCOUNTER — TELEPHONE (OUTPATIENT)
Dept: CARDIOLOGY | Facility: CLINIC | Age: 84
End: 2024-04-22
Payer: MEDICARE

## 2024-04-22 VITALS — HEART RATE: 51 BPM

## 2024-04-22 VITALS
DIASTOLIC BLOOD PRESSURE: 57 MMHG | HEART RATE: 53 BPM | SYSTOLIC BLOOD PRESSURE: 181 MMHG | TEMPERATURE: 97.9 F | OXYGEN SATURATION: 100 %

## 2024-04-22 VITALS — HEART RATE: 46 BPM | DIASTOLIC BLOOD PRESSURE: 52 MMHG | SYSTOLIC BLOOD PRESSURE: 170 MMHG | OXYGEN SATURATION: 96 %

## 2024-04-22 VITALS — HEART RATE: 51 BPM | OXYGEN SATURATION: 96 % | SYSTOLIC BLOOD PRESSURE: 195 MMHG | DIASTOLIC BLOOD PRESSURE: 52 MMHG

## 2024-04-22 VITALS — HEART RATE: 51 BPM | OXYGEN SATURATION: 94 %

## 2024-04-22 VITALS — DIASTOLIC BLOOD PRESSURE: 66 MMHG | SYSTOLIC BLOOD PRESSURE: 192 MMHG

## 2024-04-22 VITALS — OXYGEN SATURATION: 95 % | HEART RATE: 51 BPM

## 2024-04-22 VITALS — HEART RATE: 73 BPM

## 2024-04-22 VITALS — HEART RATE: 52 BPM

## 2024-04-22 VITALS
OXYGEN SATURATION: 96 % | SYSTOLIC BLOOD PRESSURE: 194 MMHG | HEART RATE: 63 BPM | TEMPERATURE: 98.1 F | DIASTOLIC BLOOD PRESSURE: 65 MMHG

## 2024-04-22 VITALS — OXYGEN SATURATION: 96 % | HEART RATE: 50 BPM | SYSTOLIC BLOOD PRESSURE: 187 MMHG | DIASTOLIC BLOOD PRESSURE: 57 MMHG

## 2024-04-22 VITALS — DIASTOLIC BLOOD PRESSURE: 53 MMHG | OXYGEN SATURATION: 96 % | SYSTOLIC BLOOD PRESSURE: 173 MMHG | HEART RATE: 50 BPM

## 2024-04-22 VITALS
HEART RATE: 55 BPM | DIASTOLIC BLOOD PRESSURE: 80 MMHG | SYSTOLIC BLOOD PRESSURE: 200 MMHG | TEMPERATURE: 97.8 F | OXYGEN SATURATION: 96 %

## 2024-04-22 VITALS — HEART RATE: 48 BPM | SYSTOLIC BLOOD PRESSURE: 180 MMHG | OXYGEN SATURATION: 95 % | DIASTOLIC BLOOD PRESSURE: 63 MMHG

## 2024-04-22 VITALS — SYSTOLIC BLOOD PRESSURE: 193 MMHG | DIASTOLIC BLOOD PRESSURE: 69 MMHG

## 2024-04-22 VITALS — BODY MASS INDEX: 33.6 KG/M2 | BODY MASS INDEX: 32.4 KG/M2

## 2024-04-22 VITALS — OXYGEN SATURATION: 96 % | HEART RATE: 51 BPM

## 2024-04-22 VITALS — SYSTOLIC BLOOD PRESSURE: 158 MMHG | HEART RATE: 68 BPM | DIASTOLIC BLOOD PRESSURE: 64 MMHG

## 2024-04-22 VITALS — SYSTOLIC BLOOD PRESSURE: 194 MMHG | DIASTOLIC BLOOD PRESSURE: 65 MMHG

## 2024-04-22 VITALS — HEART RATE: 46 BPM

## 2024-04-22 VITALS — SYSTOLIC BLOOD PRESSURE: 180 MMHG | DIASTOLIC BLOOD PRESSURE: 78 MMHG

## 2024-04-22 VITALS — OXYGEN SATURATION: 96 % | DIASTOLIC BLOOD PRESSURE: 58 MMHG | SYSTOLIC BLOOD PRESSURE: 176 MMHG | HEART RATE: 50 BPM

## 2024-04-22 VITALS — OXYGEN SATURATION: 95 % | HEART RATE: 53 BPM

## 2024-04-22 VITALS — HEART RATE: 50 BPM

## 2024-04-22 VITALS — SYSTOLIC BLOOD PRESSURE: 191 MMHG | DIASTOLIC BLOOD PRESSURE: 70 MMHG

## 2024-04-22 VITALS — HEART RATE: 45 BPM

## 2024-04-22 VITALS — HEART RATE: 60 BPM

## 2024-04-22 VITALS — OXYGEN SATURATION: 96 %

## 2024-04-22 VITALS — HEART RATE: 53 BPM

## 2024-04-22 DIAGNOSIS — G47.30: ICD-10-CM

## 2024-04-22 DIAGNOSIS — N18.31: ICD-10-CM

## 2024-04-22 DIAGNOSIS — Z88.2: ICD-10-CM

## 2024-04-22 DIAGNOSIS — I25.10: ICD-10-CM

## 2024-04-22 DIAGNOSIS — Z88.5: ICD-10-CM

## 2024-04-22 DIAGNOSIS — Z88.8: ICD-10-CM

## 2024-04-22 DIAGNOSIS — K21.9: ICD-10-CM

## 2024-04-22 DIAGNOSIS — Z88.7: ICD-10-CM

## 2024-04-22 DIAGNOSIS — G62.9: ICD-10-CM

## 2024-04-22 DIAGNOSIS — I50.32: ICD-10-CM

## 2024-04-22 DIAGNOSIS — Z79.899: ICD-10-CM

## 2024-04-22 DIAGNOSIS — R00.1: ICD-10-CM

## 2024-04-22 DIAGNOSIS — E66.9: ICD-10-CM

## 2024-04-22 DIAGNOSIS — E03.9: ICD-10-CM

## 2024-04-22 DIAGNOSIS — I13.0: ICD-10-CM

## 2024-04-22 DIAGNOSIS — Z79.02: ICD-10-CM

## 2024-04-22 DIAGNOSIS — Z88.1: ICD-10-CM

## 2024-04-22 DIAGNOSIS — I16.0: Primary | ICD-10-CM

## 2024-04-22 DIAGNOSIS — Z79.82: ICD-10-CM

## 2024-04-22 LAB
ADD MANUAL DIFF: NO
ANION GAP: 11.7
BLOOD UREA NITROGEN: 21 MG/DL (ref 7–18)
CALCIUM: 9 MG/DL (ref 8.5–10.1)
CARBON DIOXIDE: 30.4 MMOL/L (ref 21–32)
CHLORIDE: 106 MMOL/L (ref 98–107)
ESTIMATED GFR (AFRICAN AMERICA: 42 (ref 60–?)
ESTIMATED GFR (NON-AFRICAN AME: 34 (ref 60–?)
GLUCOSE: 102 MG/DL (ref 74–106)
HEMATOCRIT: 31.7 % (ref 36–48)
HEMOGLOBIN: 10.3 G/DL (ref 12–16)
IMMATURE GRANULOCYTES ABS AUTO: 0.05 10^3/UL (ref 0–0.03)
IMMATURE GRANULOCYTES PCT AUTO: 0.7 % (ref 0–0.5)
LYMPHOCYTES  ABSOLUTE AUTO: 0.8 10^3/UL (ref 1.2–3.8)
MAGNESIUM: 2.3 MG/DL (ref 1.8–2.4)
MCV RBC: 90.1 FL (ref 81–99)
MEAN CORPUSCULAR HEMOGLOBIN: 29.3 PG (ref 26.7–34)
MEAN CORPUSCULAR HGB CONC: 32.5 G/DL (ref 29.9–35.2)
NT PRO B TYPE NATRIURETIC PEPT: 800 PG/ML (ref ?–1800)
PLATELET COUNT: 255 10^3/UL (ref 150–450)
POTASSIUM: 4.1 MMOL/L (ref 3.5–5.1)
RED BLOOD COUNT: 3.52 10^6/UL (ref 4.2–5.4)
SODIUM: 144 MMOL/L (ref 136–145)
WHITE BLOOD COUNT: 6.8 10^3/UL (ref 4–11)

## 2024-04-22 PROCEDURE — 93306 TTE W/DOPPLER COMPLETE: CPT

## 2024-04-22 PROCEDURE — 85025 COMPLETE CBC W/AUTO DIFF WBC: CPT

## 2024-04-22 PROCEDURE — 36415 COLL VENOUS BLD VENIPUNCTURE: CPT

## 2024-04-22 PROCEDURE — 99285 EMERGENCY DEPT VISIT HI MDM: CPT

## 2024-04-22 PROCEDURE — 93005 ELECTROCARDIOGRAM TRACING: CPT

## 2024-04-22 PROCEDURE — 80048 BASIC METABOLIC PNL TOTAL CA: CPT

## 2024-04-22 PROCEDURE — 84484 ASSAY OF TROPONIN QUANT: CPT

## 2024-04-22 PROCEDURE — 80061 LIPID PANEL: CPT

## 2024-04-22 PROCEDURE — 84443 ASSAY THYROID STIM HORMONE: CPT

## 2024-04-22 PROCEDURE — 97161 PT EVAL LOW COMPLEX 20 MIN: CPT

## 2024-04-22 PROCEDURE — 83735 ASSAY OF MAGNESIUM: CPT

## 2024-04-22 PROCEDURE — 96376 TX/PRO/DX INJ SAME DRUG ADON: CPT

## 2024-04-22 PROCEDURE — 94761 N-INVAS EAR/PLS OXIMETRY MLT: CPT

## 2024-04-22 PROCEDURE — 71045 X-RAY EXAM CHEST 1 VIEW: CPT

## 2024-04-22 PROCEDURE — 80053 COMPREHEN METABOLIC PANEL: CPT

## 2024-04-22 PROCEDURE — 83880 ASSAY OF NATRIURETIC PEPTIDE: CPT

## 2024-04-22 PROCEDURE — 96374 THER/PROPH/DIAG INJ IV PUSH: CPT

## 2024-04-22 RX ADMIN — HYDRALAZINE HYDROCHLORIDE 10 MG: 20 INJECTION, SOLUTION INTRAMUSCULAR; INTRAVENOUS at 18:36

## 2024-04-22 RX ADMIN — LOSARTAN POTASSIUM 50 MG: 50 TABLET, FILM COATED ORAL at 19:55

## 2024-04-22 RX ADMIN — HYDRALAZINE HYDROCHLORIDE 10 MG: 20 INJECTION, SOLUTION INTRAMUSCULAR; INTRAVENOUS at 22:20

## 2024-04-22 RX ADMIN — GABAPENTIN 100 MG: 100 CAPSULE ORAL at 19:55

## 2024-04-22 RX ADMIN — ALPRAZOLAM 0.25 MG: 0.25 TABLET ORAL at 22:26

## 2024-04-22 NOTE — TELEPHONE ENCOUNTER
Patient phoned into office that she saw her PCP's office today due to not feeling well recently. States that her BP was 172/68 HR 45-47. Patient also states that she has the wheezing in her chest, like she had prior to her last heart cath. Wheezing has been happening for the last couple weeks. States that she hasn't been doing much due to being very tired. States that she is just not what she was a month ago. PCP and Ed did do labs recently (couple weeks ago - done at [facility]). Patient is concerned about heart rate being low and wheezing in chest    To Dr. Martínez Peters, DO for review.    To medical records to get labs from [facility]

## 2024-04-23 VITALS — SYSTOLIC BLOOD PRESSURE: 178 MMHG | DIASTOLIC BLOOD PRESSURE: 47 MMHG

## 2024-04-23 VITALS
OXYGEN SATURATION: 94 % | TEMPERATURE: 98 F | HEART RATE: 58 BPM | DIASTOLIC BLOOD PRESSURE: 76 MMHG | SYSTOLIC BLOOD PRESSURE: 165 MMHG

## 2024-04-23 VITALS — SYSTOLIC BLOOD PRESSURE: 156 MMHG | DIASTOLIC BLOOD PRESSURE: 55 MMHG

## 2024-04-23 VITALS
OXYGEN SATURATION: 94 % | SYSTOLIC BLOOD PRESSURE: 189 MMHG | TEMPERATURE: 98 F | HEART RATE: 56 BPM | DIASTOLIC BLOOD PRESSURE: 69 MMHG

## 2024-04-23 VITALS — OXYGEN SATURATION: 68 %

## 2024-04-23 VITALS
HEART RATE: 53 BPM | DIASTOLIC BLOOD PRESSURE: 55 MMHG | TEMPERATURE: 97.9 F | OXYGEN SATURATION: 96 % | SYSTOLIC BLOOD PRESSURE: 156 MMHG

## 2024-04-23 VITALS — HEART RATE: 55 BPM

## 2024-04-23 VITALS
SYSTOLIC BLOOD PRESSURE: 162 MMHG | TEMPERATURE: 97.52 F | OXYGEN SATURATION: 94 % | DIASTOLIC BLOOD PRESSURE: 82 MMHG | HEART RATE: 59 BPM

## 2024-04-23 VITALS — HEART RATE: 52 BPM

## 2024-04-23 VITALS
TEMPERATURE: 98.4 F | SYSTOLIC BLOOD PRESSURE: 191 MMHG | DIASTOLIC BLOOD PRESSURE: 47 MMHG | HEART RATE: 53 BPM | OXYGEN SATURATION: 95 %

## 2024-04-23 VITALS
HEART RATE: 63 BPM | DIASTOLIC BLOOD PRESSURE: 63 MMHG | OXYGEN SATURATION: 94 % | TEMPERATURE: 97.88 F | SYSTOLIC BLOOD PRESSURE: 150 MMHG

## 2024-04-23 VITALS — HEART RATE: 50 BPM

## 2024-04-23 VITALS — HEART RATE: 59 BPM

## 2024-04-23 VITALS — HEART RATE: 67 BPM

## 2024-04-23 VITALS — HEART RATE: 88 BPM

## 2024-04-23 VITALS — HEART RATE: 57 BPM

## 2024-04-23 VITALS — OXYGEN SATURATION: 97 %

## 2024-04-23 VITALS — HEART RATE: 53 BPM

## 2024-04-23 VITALS — HEART RATE: 46 BPM

## 2024-04-23 LAB
ADD MANUAL DIFF: NO
ALANINE AMINOTRANSFERASE: 15 U/L (ref 14–59)
ALBUMIN GLOBULIN RATIO: 1.1
ALBUMIN LEVEL: 3.2 G/DL (ref 3.4–5)
ALKALINE PHOSPHATASE: 49 U/L (ref 46–116)
ANION GAP: 12.3
ASPARTATE AMINO TRANSFERASE: 15 U/L (ref 15–37)
BLOOD UREA NITROGEN: 20 MG/DL (ref 7–18)
CALCIUM: 9.4 MG/DL (ref 8.5–10.1)
CARBON DIOXIDE: 28.7 MMOL/L (ref 21–32)
CHLORIDE: 108 MMOL/L (ref 98–107)
CHOLESTEROL: 158 MG/DL (ref ?–200)
ESTIMATED GFR (AFRICAN AMERICA: 53 (ref 60–?)
ESTIMATED GFR (NON-AFRICAN AME: 44 (ref 60–?)
GLOBULIN: 2.8 G/DL
GLUCOSE: 93 MG/DL (ref 74–106)
HDL CHOLESTEROL: 42 MG/DL (ref 40–60)
HEMATOCRIT: 31.2 % (ref 36–48)
HEMOGLOBIN: 10 G/DL (ref 12–16)
IMMATURE GRANULOCYTES ABS AUTO: 0.03 10^3/UL (ref 0–0.03)
IMMATURE GRANULOCYTES PCT AUTO: 0.6 % (ref 0–0.5)
LYMPHOCYTES  ABSOLUTE AUTO: 1.1 10^3/UL (ref 1.2–3.8)
MCV RBC: 92.3 FL (ref 81–99)
MEAN CORPUSCULAR HEMOGLOBIN: 29.6 PG (ref 26.7–34)
MEAN CORPUSCULAR HGB CONC: 32.1 G/DL (ref 29.9–35.2)
PLATELET COUNT: 262 10^3/UL (ref 150–450)
POTASSIUM: 4 MMOL/L (ref 3.5–5.1)
RED BLOOD COUNT: 3.38 10^6/UL (ref 4.2–5.4)
SODIUM: 145 MMOL/L (ref 136–145)
THYROID STIMULATING HORMONE: 4.7 UIU/ML (ref 0.36–3.74)
TOTAL PROTEIN: 6 G/DL (ref 6.4–8.2)
TRIGLYCERIDES: 78 MG/DL (ref ?–150)
VLDL CHOLESTEROL: 15.6 MG/DL
WHITE BLOOD COUNT: 5.3 10^3/UL (ref 4–11)

## 2024-04-23 RX ADMIN — CLOPIDOGREL BISULFATE 75 MG: 75 TABLET ORAL at 09:02

## 2024-04-23 RX ADMIN — TEMAZEPAM 15 MG: 15 CAPSULE ORAL at 21:56

## 2024-04-23 RX ADMIN — GABAPENTIN 100 MG: 100 CAPSULE ORAL at 17:01

## 2024-04-23 RX ADMIN — LOSARTAN POTASSIUM 50 MG: 50 TABLET, FILM COATED ORAL at 09:02

## 2024-04-23 RX ADMIN — LOSARTAN POTASSIUM 50 MG: 50 TABLET, FILM COATED ORAL at 17:01

## 2024-04-23 RX ADMIN — FUROSEMIDE 20 MG: 20 TABLET ORAL at 09:01

## 2024-04-23 RX ADMIN — ASPIRIN 81 MG: 81 TABLET ORAL at 09:02

## 2024-04-23 RX ADMIN — GABAPENTIN 100 MG: 100 CAPSULE ORAL at 09:01

## 2024-04-24 VITALS — HEART RATE: 51 BPM

## 2024-04-24 VITALS — HEART RATE: 46 BPM

## 2024-04-24 VITALS
SYSTOLIC BLOOD PRESSURE: 171 MMHG | DIASTOLIC BLOOD PRESSURE: 74 MMHG | HEART RATE: 57 BPM | OXYGEN SATURATION: 97 % | TEMPERATURE: 97.34 F

## 2024-04-24 VITALS — HEART RATE: 74 BPM

## 2024-04-24 VITALS
TEMPERATURE: 97.5 F | DIASTOLIC BLOOD PRESSURE: 71 MMHG | HEART RATE: 48 BPM | OXYGEN SATURATION: 96 % | SYSTOLIC BLOOD PRESSURE: 158 MMHG

## 2024-04-24 VITALS — OXYGEN SATURATION: 96 %

## 2024-04-24 VITALS
TEMPERATURE: 97.4 F | SYSTOLIC BLOOD PRESSURE: 127 MMHG | HEART RATE: 60 BPM | OXYGEN SATURATION: 92 % | DIASTOLIC BLOOD PRESSURE: 54 MMHG

## 2024-04-24 VITALS — HEART RATE: 52 BPM | DIASTOLIC BLOOD PRESSURE: 59 MMHG | SYSTOLIC BLOOD PRESSURE: 152 MMHG

## 2024-04-24 VITALS
OXYGEN SATURATION: 92 % | DIASTOLIC BLOOD PRESSURE: 52 MMHG | HEART RATE: 56 BPM | TEMPERATURE: 97.88 F | SYSTOLIC BLOOD PRESSURE: 132 MMHG

## 2024-04-24 VITALS — HEART RATE: 61 BPM

## 2024-04-24 VITALS — HEART RATE: 47 BPM

## 2024-04-24 VITALS — HEART RATE: 52 BPM

## 2024-04-24 VITALS — HEART RATE: 49 BPM

## 2024-04-24 LAB
ADD MANUAL DIFF: NO
ALANINE AMINOTRANSFERASE: 13 U/L (ref 14–59)
ALBUMIN GLOBULIN RATIO: 1.1
ALBUMIN LEVEL: 2.9 G/DL (ref 3.4–5)
ALKALINE PHOSPHATASE: 44 U/L (ref 46–116)
ANION GAP: 9.9
ASPARTATE AMINO TRANSFERASE: 14 U/L (ref 15–37)
BLOOD UREA NITROGEN: 18 MG/DL (ref 7–18)
CALCIUM: 8.9 MG/DL (ref 8.5–10.1)
CARBON DIOXIDE: 27.9 MMOL/L (ref 21–32)
CHLORIDE: 107 MMOL/L (ref 98–107)
ESTIMATED GFR (AFRICAN AMERICA: 59 (ref 60–?)
ESTIMATED GFR (NON-AFRICAN AME: 49 (ref 60–?)
GLOBULIN: 2.7 G/DL
GLUCOSE: 83 MG/DL (ref 74–106)
HEMATOCRIT: 29.2 % (ref 36–48)
HEMOGLOBIN: 9.5 G/DL (ref 12–16)
IMMATURE GRANULOCYTES ABS AUTO: 0.01 10^3/UL (ref 0–0.03)
IMMATURE GRANULOCYTES PCT AUTO: 0.2 % (ref 0–0.5)
LYMPHOCYTES  ABSOLUTE AUTO: 1 10^3/UL (ref 1.2–3.8)
MCV RBC: 89.8 FL (ref 81–99)
MEAN CORPUSCULAR HEMOGLOBIN: 29.2 PG (ref 26.7–34)
MEAN CORPUSCULAR HGB CONC: 32.5 G/DL (ref 29.9–35.2)
PLATELET COUNT: 250 10^3/UL (ref 150–450)
POTASSIUM: 3.8 MMOL/L (ref 3.5–5.1)
RED BLOOD COUNT: 3.25 10^6/UL (ref 4.2–5.4)
SODIUM: 141 MMOL/L (ref 136–145)
TOTAL PROTEIN: 5.6 G/DL (ref 6.4–8.2)
WHITE BLOOD COUNT: 4.7 10^3/UL (ref 4–11)

## 2024-04-24 RX ADMIN — GABAPENTIN 100 MG: 100 CAPSULE ORAL at 08:11

## 2024-04-24 RX ADMIN — FUROSEMIDE 20 MG: 20 TABLET ORAL at 08:11

## 2024-04-24 RX ADMIN — LEVOTHYROXINE SODIUM 75 MCG: 0.07 TABLET ORAL at 05:32

## 2024-04-24 RX ADMIN — CLOPIDOGREL BISULFATE 75 MG: 75 TABLET ORAL at 08:10

## 2024-04-24 RX ADMIN — SACUBITRIL AND VALSARTAN 1 TAB: 24; 26 TABLET, FILM COATED ORAL at 08:11

## 2024-04-25 NOTE — TELEPHONE ENCOUNTER
Phoned and spoke with patient. States that she ended up going to the ER at [facility]. There they changed several of her medication. She is still taking her Lasix 20 mg BID, started her on 3 new medications - Entresto 24-26 mg BID, Levothyroxine 75 mg daily, Nifedipine 30 mg daily. While there they did echo / labs / xray (will request records). She does not currently see a pulmonary drMelanie GARCIA with Alexia Sood NP 5/8/2024    To medical records to get [facility] records    To Dr. Martínez Peters DO for review

## 2024-05-08 ENCOUNTER — OFFICE VISIT (OUTPATIENT)
Dept: CARDIOLOGY | Facility: CLINIC | Age: 84
End: 2024-05-08
Payer: MEDICARE

## 2024-05-08 VITALS
SYSTOLIC BLOOD PRESSURE: 120 MMHG | HEIGHT: 65 IN | BODY MASS INDEX: 32.65 KG/M2 | WEIGHT: 196 LBS | DIASTOLIC BLOOD PRESSURE: 68 MMHG | HEART RATE: 62 BPM

## 2024-05-08 DIAGNOSIS — I10 ESSENTIAL HYPERTENSION: ICD-10-CM

## 2024-05-08 DIAGNOSIS — E78.2 MIXED HYPERLIPIDEMIA: ICD-10-CM

## 2024-05-08 DIAGNOSIS — I25.10 ASHD (ARTERIOSCLEROTIC HEART DISEASE): Primary | ICD-10-CM

## 2024-05-08 PROBLEM — E78.5 HYPERLIPIDEMIA: Status: ACTIVE | Noted: 2024-05-08

## 2024-05-08 LAB
NON-UH HIE ANION GAP: 10.1 (ref 6–15)
NON-UH HIE BLOOD UREA NITROGEN: 26 MG/DL (ref 7–25)
NON-UH HIE CALCIUM: 9.2 MG/DL (ref 8.6–10.3)
NON-UH HIE CARBON DIOXIDE: 32.1 MMOL/L (ref 21–31)
NON-UH HIE CHLORIDE: 104 MMOL/L (ref 98–107)
NON-UH HIE CREATININE: 1.36 MG/DL (ref 0.6–1.2)
NON-UH HIE ESTIMATED GFR: 39.13
NON-UH HIE GLUCOSE: 79 MG/DL (ref 70–100)
NON-UH HIE POTASSIUM: 5.2 MMOL/L (ref 3.5–5.1)
NON-UH HIE SODIUM: 141 MMOL/L (ref 136–145)

## 2024-05-08 PROCEDURE — 3078F DIAST BP <80 MM HG: CPT | Performed by: NURSE PRACTITIONER

## 2024-05-08 PROCEDURE — 1160F RVW MEDS BY RX/DR IN RCRD: CPT | Performed by: NURSE PRACTITIONER

## 2024-05-08 PROCEDURE — 3074F SYST BP LT 130 MM HG: CPT | Performed by: NURSE PRACTITIONER

## 2024-05-08 PROCEDURE — 99214 OFFICE O/P EST MOD 30 MIN: CPT | Performed by: NURSE PRACTITIONER

## 2024-05-08 PROCEDURE — 1159F MED LIST DOCD IN RCRD: CPT | Performed by: NURSE PRACTITIONER

## 2024-05-08 RX ORDER — ROSUVASTATIN CALCIUM 40 MG/1
40 TABLET, COATED ORAL DAILY
COMMUNITY
Start: 2024-04-10

## 2024-05-08 RX ORDER — NIFEDIPINE 30 MG/1
30 TABLET, EXTENDED RELEASE ORAL DAILY
COMMUNITY
Start: 2024-04-24

## 2024-05-08 RX ORDER — SACUBITRIL AND VALSARTAN 24; 26 MG/1; MG/1
1 TABLET, FILM COATED ORAL 2 TIMES DAILY
COMMUNITY
Start: 2024-04-24

## 2024-05-08 NOTE — LETTER
"May 9, 2024     Nabil Hayden DO  [address] [place] [address]    Patient: Adele Wild   YOB: 1940   Date of Visit: 5/8/2024       Dear Dr. Nabil Hayden DO:    Thank you for referring Adele Wild to me for evaluation. Below are my notes for this consultation.  If you have questions, please do not hesitate to call me. I look forward to following your patient along with you.       Sincerely,     Alexia Lenz, APRN-CNP      CC: No Recipients  ______________________________________________________________________________________    Chief Complaint  \"I am just not feeling good\"     Reason for Visit  Patient presents to the office today for outpatient follow-up for hospital follow-up.  Last evaluated in clinic by Dr. Peters December 2023.    April 2024: Hospitalized at [facility] due to accelerated hypertension and bradycardia.  She was seen by [place] cardiology.  Inpatient echo showed EF greater than 55%, mild biatrial enlargement and RVSP of 41 mmHg.  There was no documented significant dysrhythmia.  Heart rates were reportedly in the 40s.  She was initiated on Entresto and nifedipine.  Prior dose of Avapro should have been discontinued.  Her dose of carvedilol was also discontinued.    Presents today ambulatory with steady gait.  Accompanied by child    History of Present Illness   Patient is a pleasant 83-year-old female who presents to the office today is somewhat of a difficult historian.  She really is not clear as to why she was sent to the emergency department.  There were some phone calls into our office reporting wheezing and shortness of breath.  The daughter reports she had been seen by PCP and blood pressure was elevated and\" heart rate was low\"-some documentation of blood pressure being 200/80 during hospitalization.  She is tearful in the office regarding the care she received during the hospitalization.    She has been home now for approximately 2 weeks.  Reportedly potted " "some flowers over the weekend and had\" pain between her shoulder blades\".  She related this to bending over and planting the flowers.  She then goes on to report that her PCI symptom was pain between her shoulder blades.  She reports that she\" felt so much better after her stents\" but over the last 2 months she has noted a steady decline.  She reportedly completed cardiac rehab 2 months ago.  Prior to hospitalization she had 2 episodes of what sounds like PND.  She occasionally goes down to the stairs to her basement without complaints.  Overall she just reports a change in exercise capacity, fatigability and just\" no ambition\".    Explained to daughter and patient rationale behind discontinuation of Coreg due to bradycardia.  Both Entresto and Avapro are on her medication list today.  She is confused as to what she is taking at home.  They will verify and notify office.    Otherwise, she is very anxious and worried about her recent decline in functional capacity.  Discussed noninvasive ischemic evaluation with perfusion study, will be able to assess blood pressure with exercise at that time and chronotropic response.  Both patient and daughter are much relieved and appreciated with this approach.  If perfusion study is unremarkable then we will refer her back over to cardiac rehab.    She is taking Lasix and potassium once daily in the morning.  She uses the afternoon dose as needed and has rarely been utilizing.    All questions and concerns have been addressed.    Patient reports that overall has no complaint(s) of dyspnea, irregular heart beat, lower extremity edema, orthopnea, and palpitations or has complaint(s) of chest pressure/discomfort and fatigue.    Review of Systems   Constitutional: Positive for malaise/fatigue.   Cardiovascular:  Positive for chest pain. Negative for dyspnea on exertion, irregular heartbeat, leg swelling, near-syncope, orthopnea, palpitations, paroxysmal nocturnal dyspnea and " "syncope.        Visit Vitals  /68 (BP Location: Left arm, Patient Position: Sitting)   Pulse 62   Ht 1.651 m (5' 5\")   Wt 88.9 kg (196 lb)   BMI 32.62 kg/m²   Smoking Status Former   BSA 2.02 m²     Physical Exam  Vitals and nursing note reviewed.   HENT:      Head: Normocephalic.   Cardiovascular:      Rate and Rhythm: Normal rate and regular rhythm.      Heart sounds: Normal heart sounds.   Pulmonary:      Effort: Pulmonary effort is normal.      Breath sounds: Normal breath sounds.   Abdominal:      Palpations: Abdomen is soft.   Musculoskeletal:      Right lower leg: No edema.      Left lower leg: No edema.   Skin:     General: Skin is warm and dry.   Neurological:      General: No focal deficit present.      Mental Status: She is alert.   Psychiatric:         Mood and Affect: Mood normal.         Behavior: Behavior normal.       Allergies   Allergen Reactions   • Bactrim [Sulfamethoxazole-Trimethoprim] Hives   • Zithromax Z-Christiano [Azithromycin] Swelling     Tongue swelling       Current Outpatient Medications   Medication Instructions   • ALPRAZolam (NIRAVAM) 0.25 mg, oral, Nightly PRN   • aspirin 81 mg, oral, Daily   • clopidogrel (PLAVIX) 75 mg, oral, Daily   • Entresto 24-26 mg tablet 1 tablet, oral, 2 times daily   • furosemide (LASIX) 20 mg, oral, 2 times daily   • gabapentin (NEURONTIN) 100 mg, oral, 2 times daily   • NIFEdipine ER (ADALAT CC) 30 mg, oral, Daily   • pantoprazole (PROTONIX) 40 mg, oral, Daily before breakfast, Do not crush, chew, or split.   • potassium chloride CR 10 mEq ER tablet 10 mEq, oral, 2 times daily, Do not crush, chew, or split.   • rosuvastatin (CRESTOR) 40 mg, oral, Daily   • temazepam (RESTORIL) 15 mg, oral, Nightly PRN      Assessment:    An 83-year-old female presents today with a 2-month history of functional decline, change in exercise capacity.  Reports exertional\" pain between shoulder blades\" that may be reminiscent of prior PCI symptoms.  No nitroglycerin " usage.  Confusion regarding polypharmacy.    ASHD (arteriosclerotic heart disease)  May 12, 2023   Mid/proximal RCA PCI/[place]  4x15mm & 4x18mm  Proximal diagonal PCI/[place] 2.5 x 18 mm  LAD 10%  Circumflex normal  LVEF 65%    Essential hypertension  Optimal in office    Hyperlipidemia  High intensity statin  April 2024 HDL 42, cholesterol 158    BMI 32.0-32.9,adult  Reviewed the merits of healthy lifestyle choices on overall cardiovascular health.      Plan:     Through informed decision making process incorporating patients unique circumstances, the following treatment plan will be initiated:    1.  Prescription drug management of cardiovascular medication for efficacy, adherence to treatment, side effect assessment and polypharmacy. Current treatment clinically warranted and to continue without modifications.    2.  Lexiscan MPI (ALVAREZ, fatigue) no treadmill due to weakness, OA    3.  Labs (chem6)    4. Return for follow-up; in the interim, contact the office if new symptoms arise.  NP after testing     Alexia Lenz  MSN, APRN-CNP, PMHNP-BC  [facility]    Please excuse any errors in grammar or translation related to this dictation. Voice recognition software was utilized to prepare this document.

## 2024-05-08 NOTE — PATIENT INSTRUCTIONS
Please bring all medicines, vitamins, and herbal supplements with you when you come to the office.    Prescriptions will not be filled unless you are compliant with your follow up appointments or have a follow up appointment scheduled as per instruction of your physician. Refills should be requested at the time of your visit.     PLAN:   Through informed decision making process incorporating patients unique circumstances, the following treatment plan will be initiated:    1.  Prescription drug management of cardiovascular medication for efficacy, adherence to treatment, side effect assessment and polypharmacy. Current treatment clinically warranted and to continue without modifications.    2.  Lexiscan MPI (ALVAREZ, fatigue) no treadmill due to weakness, OA    3.  Labs (chem6)    4. Return for follow-up; in the interim, contact the office if new symptoms arise.  NP after testing

## 2024-05-09 ENCOUNTER — TELEPHONE (OUTPATIENT)
Dept: CARDIOLOGY | Facility: CLINIC | Age: 84
End: 2024-05-09
Payer: MEDICARE

## 2024-05-09 ASSESSMENT — ENCOUNTER SYMPTOMS
ORTHOPNEA: 0
PND: 0
IRREGULAR HEARTBEAT: 0
DYSPNEA ON EXERTION: 0
NEAR-SYNCOPE: 0
SYNCOPE: 0
PALPITATIONS: 0

## 2024-05-09 NOTE — TELEPHONE ENCOUNTER
----- Message from BRENNA Conner sent at 5/9/2024 10:52 AM EDT -----  Her potassium is on higher side of normal - she needs to stop KCL supplements  ----- Message -----  From: Torsten Donovan - Lab Results In  Sent: 5/8/2024   3:52 PM EDT  To: BRENNA Conner

## 2024-05-09 NOTE — ASSESSMENT & PLAN NOTE
May 12, 2023   Mid/proximal RCA PCI/[place]  4x15mm & 4x18mm  Proximal diagonal PCI/[place] 2.5 x 18 mm  LAD 10%  Circumflex normal  LVEF 65%

## 2024-05-09 NOTE — PROGRESS NOTES
"Chief Complaint  \"I am just not feeling good\"     Reason for Visit  Patient presents to the office today for outpatient follow-up for hospital follow-up.  Last evaluated in clinic by Dr. Peters December 2023.    April 2024: Hospitalized at [facility] due to accelerated hypertension and bradycardia.  She was seen by [place] cardiology.  Inpatient echo showed EF greater than 55%, mild biatrial enlargement and RVSP of 41 mmHg.  There was no documented significant dysrhythmia.  Heart rates were reportedly in the 40s.  She was initiated on Entresto and nifedipine.  Prior dose of Avapro should have been discontinued.  Her dose of carvedilol was also discontinued.    Presents today ambulatory with steady gait.  Accompanied by child    History of Present Illness   Patient is a pleasant 83-year-old female who presents to the office today is somewhat of a difficult historian.  She really is not clear as to why she was sent to the emergency department.  There were some phone calls into our office reporting wheezing and shortness of breath.  The daughter reports she had been seen by PCP and blood pressure was elevated and\" heart rate was low\"-some documentation of blood pressure being 200/80 during hospitalization.  She is tearful in the office regarding the care she received during the hospitalization.    She has been home now for approximately 2 weeks.  Reportedly potted some flowers over the weekend and had\" pain between her shoulder blades\".  She related this to bending over and planting the flowers.  She then goes on to report that her PCI symptom was pain between her shoulder blades.  She reports that she\" felt so much better after her stents\" but over the last 2 months she has noted a steady decline.  She reportedly completed cardiac rehab 2 months ago.  Prior to hospitalization she had 2 episodes of what sounds like PND.  She occasionally goes down to the stairs to her basement without complaints.  Overall she just reports a " "change in exercise capacity, fatigability and just\" no ambition\".    Explained to daughter and patient rationale behind discontinuation of Coreg due to bradycardia.  Both Entresto and Avapro are on her medication list today.  She is confused as to what she is taking at home.  They will verify and notify office.    Otherwise, she is very anxious and worried about her recent decline in functional capacity.  Discussed noninvasive ischemic evaluation with perfusion study, will be able to assess blood pressure with exercise at that time and chronotropic response.  Both patient and daughter are much relieved and appreciated with this approach.  If perfusion study is unremarkable then we will refer her back over to cardiac rehab.    She is taking Lasix and potassium once daily in the morning.  She uses the afternoon dose as needed and has rarely been utilizing.    All questions and concerns have been addressed.    Patient reports that overall has no complaint(s) of dyspnea, irregular heart beat, lower extremity edema, orthopnea, and palpitations or has complaint(s) of chest pressure/discomfort and fatigue.    Review of Systems   Constitutional: Positive for malaise/fatigue.   Cardiovascular:  Positive for chest pain. Negative for dyspnea on exertion, irregular heartbeat, leg swelling, near-syncope, orthopnea, palpitations, paroxysmal nocturnal dyspnea and syncope.        Visit Vitals  /68 (BP Location: Left arm, Patient Position: Sitting)   Pulse 62   Ht 1.651 m (5' 5\")   Wt 88.9 kg (196 lb)   BMI 32.62 kg/m²   Smoking Status Former   BSA 2.02 m²     Physical Exam  Vitals and nursing note reviewed.   HENT:      Head: Normocephalic.   Cardiovascular:      Rate and Rhythm: Normal rate and regular rhythm.      Heart sounds: Normal heart sounds.   Pulmonary:      Effort: Pulmonary effort is normal.      Breath sounds: Normal breath sounds.   Abdominal:      Palpations: Abdomen is soft.   Musculoskeletal:      Right lower " "leg: No edema.      Left lower leg: No edema.   Skin:     General: Skin is warm and dry.   Neurological:      General: No focal deficit present.      Mental Status: She is alert.   Psychiatric:         Mood and Affect: Mood normal.         Behavior: Behavior normal.       Allergies   Allergen Reactions    Bactrim [Sulfamethoxazole-Trimethoprim] Hives    Zithromax Z-Christiano [Azithromycin] Swelling     Tongue swelling       Current Outpatient Medications   Medication Instructions    ALPRAZolam (NIRAVAM) 0.25 mg, oral, Nightly PRN    aspirin 81 mg, oral, Daily    clopidogrel (PLAVIX) 75 mg, oral, Daily    Entresto 24-26 mg tablet 1 tablet, oral, 2 times daily    furosemide (LASIX) 20 mg, oral, 2 times daily    gabapentin (NEURONTIN) 100 mg, oral, 2 times daily    NIFEdipine ER (ADALAT CC) 30 mg, oral, Daily    pantoprazole (PROTONIX) 40 mg, oral, Daily before breakfast, Do not crush, chew, or split.    potassium chloride CR 10 mEq ER tablet 10 mEq, oral, 2 times daily, Do not crush, chew, or split.    rosuvastatin (CRESTOR) 40 mg, oral, Daily    temazepam (RESTORIL) 15 mg, oral, Nightly PRN      Assessment:    An 83-year-old female presents today with a 2-month history of functional decline, change in exercise capacity.  Reports exertional\" pain between shoulder blades\" that may be reminiscent of prior PCI symptoms.  No nitroglycerin usage.  Confusion regarding polypharmacy.    ASHD (arteriosclerotic heart disease)  May 12, 2023   Mid/proximal RCA PCI/[place]  4x15mm & 4x18mm  Proximal diagonal PCI/[place] 2.5 x 18 mm  LAD 10%  Circumflex normal  LVEF 65%    Essential hypertension  Optimal in office    Hyperlipidemia  High intensity statin  April 2024 HDL 42, cholesterol 158    BMI 32.0-32.9,adult  Reviewed the merits of healthy lifestyle choices on overall cardiovascular health.      Plan:     Through informed decision making process incorporating patients unique circumstances, the following treatment plan will be " initiated:    1.  Prescription drug management of cardiovascular medication for efficacy, adherence to treatment, side effect assessment and polypharmacy. Current treatment clinically warranted and to continue without modifications.    2.  Lexiscan MPI (ALVAREZ, fatigue) no treadmill due to weakness, OA    3.  Labs (chem6)    4. Return for follow-up; in the interim, contact the office if new symptoms arise.  NP after testing     Alexia Lenz  MSN, APRN-CNP, PMHNP-BC  [facility]    Please excuse any errors in grammar or translation related to this dictation. Voice recognition software was utilized to prepare this document.

## 2024-05-15 ENCOUNTER — HOSPITAL ENCOUNTER (OUTPATIENT)
Dept: RADIOLOGY | Facility: CLINIC | Age: 84
Discharge: HOME | End: 2024-05-15
Payer: MEDICARE

## 2024-05-15 ENCOUNTER — HOSPITAL ENCOUNTER (OUTPATIENT)
Dept: CARDIOLOGY | Facility: CLINIC | Age: 84
Discharge: HOME | End: 2024-05-15
Payer: MEDICARE

## 2024-05-15 VITALS — HEART RATE: 62 BPM | DIASTOLIC BLOOD PRESSURE: 66 MMHG | SYSTOLIC BLOOD PRESSURE: 114 MMHG

## 2024-05-15 DIAGNOSIS — I25.10 ASHD (ARTERIOSCLEROTIC HEART DISEASE): ICD-10-CM

## 2024-05-15 PROCEDURE — A9502 TC99M TETROFOSMIN: HCPCS | Performed by: NURSE PRACTITIONER

## 2024-05-15 PROCEDURE — 93017 CV STRESS TEST TRACING ONLY: CPT

## 2024-05-15 PROCEDURE — 2500000004 HC RX 250 GENERAL PHARMACY W/ HCPCS (ALT 636 FOR OP/ED): Performed by: NURSE PRACTITIONER

## 2024-05-15 PROCEDURE — 78452 HT MUSCLE IMAGE SPECT MULT: CPT

## 2024-05-15 PROCEDURE — 3430000001 HC RX 343 DIAGNOSTIC RADIOPHARMACEUTICALS: Performed by: NURSE PRACTITIONER

## 2024-05-15 RX ORDER — REGADENOSON 0.08 MG/ML
0.4 INJECTION, SOLUTION INTRAVENOUS ONCE
Status: COMPLETED | OUTPATIENT
Start: 2024-05-15 | End: 2024-05-15

## 2024-05-15 RX ADMIN — TETROFOSMIN 34.5 MILLICURIE: 0.23 INJECTION, POWDER, LYOPHILIZED, FOR SOLUTION INTRAVENOUS at 09:34

## 2024-05-15 RX ADMIN — TETROFOSMIN 11 MILLICURIE: 0.23 INJECTION, POWDER, LYOPHILIZED, FOR SOLUTION INTRAVENOUS at 08:32

## 2024-05-15 RX ADMIN — REGADENOSON 0.4 MG: 0.08 INJECTION, SOLUTION INTRAVENOUS at 09:33

## 2024-06-24 ENCOUNTER — APPOINTMENT (OUTPATIENT)
Dept: CARDIOLOGY | Facility: CLINIC | Age: 84
End: 2024-06-24
Payer: MEDICARE

## 2024-06-24 VITALS
HEART RATE: 60 BPM | WEIGHT: 198.2 LBS | DIASTOLIC BLOOD PRESSURE: 70 MMHG | SYSTOLIC BLOOD PRESSURE: 132 MMHG | BODY MASS INDEX: 32.98 KG/M2

## 2024-06-24 DIAGNOSIS — I25.10 ASHD (ARTERIOSCLEROTIC HEART DISEASE): ICD-10-CM

## 2024-06-24 DIAGNOSIS — E78.2 MIXED HYPERLIPIDEMIA: ICD-10-CM

## 2024-06-24 DIAGNOSIS — I10 ESSENTIAL HYPERTENSION: Primary | ICD-10-CM

## 2024-06-24 PROCEDURE — 3075F SYST BP GE 130 - 139MM HG: CPT | Performed by: NURSE PRACTITIONER

## 2024-06-24 PROCEDURE — 1159F MED LIST DOCD IN RCRD: CPT | Performed by: NURSE PRACTITIONER

## 2024-06-24 PROCEDURE — 1160F RVW MEDS BY RX/DR IN RCRD: CPT | Performed by: NURSE PRACTITIONER

## 2024-06-24 PROCEDURE — 3078F DIAST BP <80 MM HG: CPT | Performed by: NURSE PRACTITIONER

## 2024-06-24 PROCEDURE — 99213 OFFICE O/P EST LOW 20 MIN: CPT | Performed by: NURSE PRACTITIONER

## 2024-06-24 RX ORDER — POTASSIUM CHLORIDE 750 MG/1
10 TABLET, FILM COATED, EXTENDED RELEASE ORAL 2 TIMES DAILY
COMMUNITY

## 2024-06-24 NOTE — LETTER
"June 25, 2024     Nabil Hayden DO  [address] [place] [address]    Patient: Adele iWld   YOB: 1940   Date of Visit: 6/24/2024       Dear Dr. Nabil Hayden DO:    Thank you for referring Adele Wild to me for evaluation. Below are my notes for this consultation.  If you have questions, please do not hesitate to call me. I look forward to following your patient along with you.       Sincerely,     Alexia Lenz, APRN-CNP      CC: No Recipients  ______________________________________________________________________________________    Chief Complaint  \" Seem to be doing better\"    Reason for Visit  1 month follow-up  Patient presents to the office today for outpatient follow-up for testing results.  Last evaluated in clinic by myself May 2024.  At that time patient complained of exertional pain between her shoulder blades.  A subsequent perfusion study showed no evidence of ischemia.    Presents today ambulatory with steady gait.  Accompanied by child  Patient denies any hospitalizations or significant changes to interval medical history since last office follow-up.     History of Present Illness   Patient presents to the office today where she reports feeling\" good\".  Her prior complaint of exertional pain between her shoulder blades has completely resolved.  She remains active going down the basement stairs to the laundry twice a week, sweeps and mops her floors and goes to the grocery store without concerns.    Her carvedilol was discontinued due to hospitalization due to bradycardia, blood pressure remains optimal and heart rate remains in the 60s on no AV porfirio blocking agents.    Recent labs showed a potassium of 5.2 and I called her to discontinue potassium supplement.  For some reason she resumed a couple days ago, will recheck Chem-6 in 1 week.    Patient reports that overall has no complaint(s) of chest pain, chest pressure/discomfort, claudication, dyspnea, exertional chest " pressure/discomfort, fatigue, and irregular heart beat    The importance of secondary prevention reviewed:  HTN: Optimal in office  HLD: Optimally treated  DM: Denies  Smoker: Denies  BMI:  Reviewed the merits of healthy lifestyle choices on overall cardiovascular health.    Overall, the patient is in much better spirits than her office visit for hospital follow-up.  She has returned to her usual state of health.  Medication list was reconciled at last visit and they remain compliant with treatment as listed below.  Will continue DAPT until next office follow-up.    Review of Systems   Cardiovascular:  Negative for chest pain, dyspnea on exertion, irregular heartbeat, leg swelling, near-syncope, orthopnea, palpitations, paroxysmal nocturnal dyspnea and syncope.        Visit Vitals  /70 (BP Location: Right arm, Patient Position: Sitting)   Pulse 60   Wt 89.9 kg (198 lb 3.2 oz)   BMI 32.98 kg/m²   Smoking Status Former   BSA 2.03 m²     Physical Exam  Vitals and nursing note reviewed.   HENT:      Head: Normocephalic.   Cardiovascular:      Rate and Rhythm: Normal rate and regular rhythm.      Heart sounds: Normal heart sounds.   Pulmonary:      Effort: Pulmonary effort is normal.      Breath sounds: Normal breath sounds.   Abdominal:      Palpations: Abdomen is soft.   Musculoskeletal:      Right lower leg: No edema.      Left lower leg: No edema.   Skin:     General: Skin is warm and dry.   Neurological:      General: No focal deficit present.      Mental Status: She is alert.   Psychiatric:         Mood and Affect: Mood normal.         Behavior: Behavior normal.        Allergies   Allergen Reactions   • Bactrim [Sulfamethoxazole-Trimethoprim] Hives   • Zithromax Z-Christiano [Azithromycin] Swelling     Tongue swelling     Current Outpatient Medications   Medication Instructions   • ALPRAZolam (NIRAVAM) 0.25 mg, oral, Nightly PRN   • aspirin 81 mg, oral, Daily   • clopidogrel (PLAVIX) 75 mg, oral, Daily   • Entresto  24-26 mg tablet 1 tablet, oral, 2 times daily   • furosemide (LASIX) 20 mg, oral, 2 times daily   • gabapentin (NEURONTIN) 100 mg, oral, 2 times daily   • NIFEdipine ER (ADALAT CC) 30 mg, oral, Daily   • pantoprazole (PROTONIX) 40 mg, oral, Daily before breakfast, Do not crush, chew, or split.   • potassium chloride CR 10 mEq ER tablet 10 mEq, oral, 2 times daily, Do not crush, chew, or split.   • rosuvastatin (CRESTOR) 40 mg, oral, Daily   • temazepam (RESTORIL) 15 mg, oral, Nightly PRN      Assessment:    ASHD (arteriosclerotic heart disease)  May 12, 2023   Mid/proximal RCA PCI/[place]  4x15mm & 4x18mm  Proximal diagonal PCI/[place] 2.5 x 18 mm  LAD 10%  Circumflex normal  LVEF 65%     May 2024 MPI ischemia, EF 88%    Current daily activity at 4 METS without concerning symptoms    Essential hypertension  Optimal in the office    Hyperlipidemia  High intensity statin  April 2024 HDL 42, cholesterol 158       BMI 32.0-32.9,adult  Reviewed the merits of healthy lifestyle choices on overall cardiovascular health.    Plan:     Through informed decision making process incorporating patients unique circumstances, the following treatment plan will be initiated:    1.  Prescription drug management of cardiovascular medication for efficacy, adherence to treatment, side effect assessment and polypharmacy. Current treatment clinically warranted and to continue without modifications.    2.  Labs in one week (chem6)    3. Return for follow-up; in the interim, contact the office if new symptoms arise.  Dr. Peters 6  months     Alexia Lenz  MSN, APRN-CNP, PMHNP-BC  [facility]    Please excuse any errors in grammar or translation related to this dictation. Voice recognition software was utilized to prepare this document.

## 2024-06-24 NOTE — PATIENT INSTRUCTIONS
Please bring all medicines, vitamins, and herbal supplements with you when you come to the office.    Prescriptions will not be filled unless you are compliant with your follow up appointments or have a follow up appointment scheduled as per instruction of your physician. Refills should be requested at the time of your visit.     PLAN:   Through informed decision making process incorporating patients unique circumstances, the following treatment plan will be initiated:    1.  Prescription drug management of cardiovascular medication for efficacy, adherence to treatment, side effect assessment and polypharmacy. Current treatment clinically warranted and to continue without modifications.    2.  Labs in one week (chem6)    3. Return for follow-up; in the interim, contact the office if new symptoms arise.  Dr. Peters 6  months

## 2024-06-25 ASSESSMENT — ENCOUNTER SYMPTOMS
NEAR-SYNCOPE: 0
IRREGULAR HEARTBEAT: 0
DYSPNEA ON EXERTION: 0
ORTHOPNEA: 0
PALPITATIONS: 0
PND: 0
SYNCOPE: 0

## 2024-06-25 NOTE — PROGRESS NOTES
"Chief Complaint  \" Seem to be doing better\"    Reason for Visit  1 month follow-up  Patient presents to the office today for outpatient follow-up for testing results.  Last evaluated in clinic by myself May 2024.  At that time patient complained of exertional pain between her shoulder blades.  A subsequent perfusion study showed no evidence of ischemia.    Presents today ambulatory with steady gait.  Accompanied by child  Patient denies any hospitalizations or significant changes to interval medical history since last office follow-up.     History of Present Illness   Patient presents to the office today where she reports feeling\" good\".  Her prior complaint of exertional pain between her shoulder blades has completely resolved.  She remains active going down the basement stairs to the laundry twice a week, sweeps and mops her floors and goes to the grocery store without concerns.    Her carvedilol was discontinued due to hospitalization due to bradycardia, blood pressure remains optimal and heart rate remains in the 60s on no AV porfirio blocking agents.    Recent labs showed a potassium of 5.2 and I called her to discontinue potassium supplement.  For some reason she resumed a couple days ago, will recheck Chem-6 in 1 week.    Patient reports that overall has no complaint(s) of chest pain, chest pressure/discomfort, claudication, dyspnea, exertional chest pressure/discomfort, fatigue, and irregular heart beat    The importance of secondary prevention reviewed:  HTN: Optimal in office  HLD: Optimally treated  DM: Denies  Smoker: Denies  BMI:  Reviewed the merits of healthy lifestyle choices on overall cardiovascular health.    Overall, the patient is in much better spirits than her office visit for hospital follow-up.  She has returned to her usual state of health.  Medication list was reconciled at last visit and they remain compliant with treatment as listed below.  Will continue DAPT until next office " follow-up.    Review of Systems   Cardiovascular:  Negative for chest pain, dyspnea on exertion, irregular heartbeat, leg swelling, near-syncope, orthopnea, palpitations, paroxysmal nocturnal dyspnea and syncope.        Visit Vitals  /70 (BP Location: Right arm, Patient Position: Sitting)   Pulse 60   Wt 89.9 kg (198 lb 3.2 oz)   BMI 32.98 kg/m²   Smoking Status Former   BSA 2.03 m²     Physical Exam  Vitals and nursing note reviewed.   HENT:      Head: Normocephalic.   Cardiovascular:      Rate and Rhythm: Normal rate and regular rhythm.      Heart sounds: Normal heart sounds.   Pulmonary:      Effort: Pulmonary effort is normal.      Breath sounds: Normal breath sounds.   Abdominal:      Palpations: Abdomen is soft.   Musculoskeletal:      Right lower leg: No edema.      Left lower leg: No edema.   Skin:     General: Skin is warm and dry.   Neurological:      General: No focal deficit present.      Mental Status: She is alert.   Psychiatric:         Mood and Affect: Mood normal.         Behavior: Behavior normal.        Allergies   Allergen Reactions    Bactrim [Sulfamethoxazole-Trimethoprim] Hives    Zithromax Z-Christiano [Azithromycin] Swelling     Tongue swelling     Current Outpatient Medications   Medication Instructions    ALPRAZolam (NIRAVAM) 0.25 mg, oral, Nightly PRN    aspirin 81 mg, oral, Daily    clopidogrel (PLAVIX) 75 mg, oral, Daily    Entresto 24-26 mg tablet 1 tablet, oral, 2 times daily    furosemide (LASIX) 20 mg, oral, 2 times daily    gabapentin (NEURONTIN) 100 mg, oral, 2 times daily    NIFEdipine ER (ADALAT CC) 30 mg, oral, Daily    pantoprazole (PROTONIX) 40 mg, oral, Daily before breakfast, Do not crush, chew, or split.    potassium chloride CR 10 mEq ER tablet 10 mEq, oral, 2 times daily, Do not crush, chew, or split.    rosuvastatin (CRESTOR) 40 mg, oral, Daily    temazepam (RESTORIL) 15 mg, oral, Nightly PRN      Assessment:    ASHD (arteriosclerotic heart disease)  May 12, 2023    Mid/proximal RCA PCI/[place]  4x15mm & 4x18mm  Proximal diagonal PCI/[place] 2.5 x 18 mm  LAD 10%  Circumflex normal  LVEF 65%     May 2024 MPI ischemia, EF 88%    Current daily activity at 4 METS without concerning symptoms    Essential hypertension  Optimal in the office    Hyperlipidemia  High intensity statin  April 2024 HDL 42, cholesterol 158       BMI 32.0-32.9,adult  Reviewed the merits of healthy lifestyle choices on overall cardiovascular health.    Plan:     Through informed decision making process incorporating patients unique circumstances, the following treatment plan will be initiated:    1.  Prescription drug management of cardiovascular medication for efficacy, adherence to treatment, side effect assessment and polypharmacy. Current treatment clinically warranted and to continue without modifications.    2.  Labs in one week (chem6)    3. Return for follow-up; in the interim, contact the office if new symptoms arise.  Dr. Peters 6  months     Alexia Lenz  MSN, APRN-CNP, PMHNP-BC  [facility]    Please excuse any errors in grammar or translation related to this dictation. Voice recognition software was utilized to prepare this document.

## 2024-06-25 NOTE — ASSESSMENT & PLAN NOTE
May 12, 2023   Mid/proximal RCA PCI/[place]el  4x15mm & 4x18mm  Proximal diagonal PCI/[place] 2.5 x 18 mm  LAD 10%  Circumflex normal  LVEF 65%     May 2024 MPI ischemia, EF 88%    Current daily activity at 4 METS without concerning symptoms

## 2024-07-03 ENCOUNTER — HOSPITAL ENCOUNTER
Dept: HOSPITAL 101 - LAB | Age: 84
Discharge: HOME | End: 2024-07-03
Payer: MEDICARE

## 2024-07-03 DIAGNOSIS — I25.10: Primary | ICD-10-CM

## 2024-07-03 LAB
ANION GAP: 11.6
BLOOD UREA NITROGEN: 26 MG/DL (ref 7–18)
CALCIUM: 8.5 MG/DL (ref 8.5–10.1)
CARBON DIOXIDE: 28.7 MMOL/L (ref 21–32)
CHLORIDE: 106 MMOL/L (ref 98–107)
CO2 BLD-SCNC: 28.7 MMOL/L (ref 21–32)
ESTIMATED GFR (AFRICAN AMERICA: 45 (ref 60–?)
ESTIMATED GFR (NON-AFRICAN AME: 37 (ref 60–?)
GLUCOSE BLD-MCNC: 98 MG/DL (ref 74–106)
POTASSIUM SERPLBLD-SCNC: 4.3 MMOL/L (ref 3.5–5.1)
POTASSIUM: 4.3 MMOL/L (ref 3.5–5.1)
SODIUM BLD-SCNC: 142 MMOL/L (ref 136–145)
SODIUM: 142 MMOL/L (ref 136–145)

## 2024-07-03 PROCEDURE — 80048 BASIC METABOLIC PNL TOTAL CA: CPT

## 2024-07-03 PROCEDURE — 36415 COLL VENOUS BLD VENIPUNCTURE: CPT

## 2024-07-23 ENCOUNTER — HOSPITAL ENCOUNTER
Dept: HOSPITAL 101 - LAB | Age: 84
Discharge: HOME | End: 2024-07-23
Payer: MEDICARE

## 2024-07-23 DIAGNOSIS — I10: Primary | ICD-10-CM

## 2024-07-23 LAB
ANION GAP: 10.5
BLOOD UREA NITROGEN: 24 MG/DL (ref 7–18)
CALCIUM: 8.8 MG/DL (ref 8.5–10.1)
CARBON DIOXIDE: 30.8 MMOL/L (ref 21–32)
CHLORIDE: 106 MMOL/L (ref 98–107)
CO2 BLD-SCNC: 30.8 MMOL/L (ref 21–32)
ESTIMATED GFR (AFRICAN AMERICA: 48 (ref 60–?)
ESTIMATED GFR (NON-AFRICAN AME: 39 (ref 60–?)
GLUCOSE BLD-MCNC: 89 MG/DL (ref 74–106)
POTASSIUM SERPLBLD-SCNC: 4.3 MMOL/L (ref 3.5–5.1)
POTASSIUM: 4.3 MMOL/L (ref 3.5–5.1)
SODIUM BLD-SCNC: 143 MMOL/L (ref 136–145)
SODIUM: 143 MMOL/L (ref 136–145)

## 2024-07-23 PROCEDURE — 80048 BASIC METABOLIC PNL TOTAL CA: CPT

## 2024-07-23 PROCEDURE — 36415 COLL VENOUS BLD VENIPUNCTURE: CPT

## 2024-08-29 ENCOUNTER — HOSPITAL ENCOUNTER
Dept: HOSPITAL 101 - RAD | Age: 84
Discharge: HOME | End: 2024-08-29
Payer: MEDICARE

## 2024-08-29 DIAGNOSIS — M25.551: Primary | ICD-10-CM

## 2024-08-29 PROCEDURE — 73502 X-RAY EXAM HIP UNI 2-3 VIEWS: CPT

## 2024-09-13 DIAGNOSIS — Z98.61 S/P PTCA (PERCUTANEOUS TRANSLUMINAL CORONARY ANGIOPLASTY): ICD-10-CM

## 2024-09-17 RX ORDER — CLOPIDOGREL BISULFATE 75 MG/1
TABLET ORAL
Qty: 90 TABLET | Refills: 3 | Status: SHIPPED | OUTPATIENT
Start: 2024-09-17

## 2024-11-15 ENCOUNTER — HOSPITAL ENCOUNTER
Dept: HOSPITAL 101 - LAB | Age: 84
Discharge: HOME | End: 2024-11-15
Payer: MEDICARE

## 2024-11-15 DIAGNOSIS — I10: ICD-10-CM

## 2024-11-15 DIAGNOSIS — I50.32: ICD-10-CM

## 2024-11-15 DIAGNOSIS — E03.8: Primary | ICD-10-CM

## 2024-11-15 LAB
ANION GAP: 13.5
BLOOD UREA NITROGEN: 20 MG/DL (ref 7–18)
CALCIUM: 8.6 MG/DL (ref 8.5–10.1)
CARBON DIOXIDE: 27 MMOL/L (ref 21–32)
CHLORIDE: 108 MMOL/L (ref 98–107)
CO2 BLD-SCNC: 27 MMOL/L (ref 21–32)
ESTIMATED GFR (AFRICAN AMERICA: 44
ESTIMATED GFR (NON-AFRICAN AME: 36
GLUCOSE BLD-MCNC: 85 MG/DL (ref 74–106)
POTASSIUM SERPLBLD-SCNC: 4.5 MMOL/L (ref 3.5–5.1)
POTASSIUM: 4.5 MMOL/L (ref 3.5–5.1)
SODIUM BLD-SCNC: 144 MMOL/L (ref 136–145)
SODIUM: 144 MMOL/L (ref 136–145)
THYROID STIMULATING HORMONE: 3.77 UIU/ML (ref 0.36–3.74)

## 2024-11-15 PROCEDURE — 36415 COLL VENOUS BLD VENIPUNCTURE: CPT

## 2024-11-15 PROCEDURE — 84439 ASSAY OF FREE THYROXINE: CPT

## 2024-11-15 PROCEDURE — 80048 BASIC METABOLIC PNL TOTAL CA: CPT

## 2024-11-15 PROCEDURE — 84443 ASSAY THYROID STIM HORMONE: CPT

## 2024-11-26 ENCOUNTER — HOSPITAL ENCOUNTER
Dept: HOSPITAL 101 - US | Age: 84
Discharge: HOME | End: 2024-11-26
Payer: MEDICARE

## 2024-11-26 DIAGNOSIS — E04.2: Primary | ICD-10-CM

## 2024-11-26 DIAGNOSIS — Z80.8: ICD-10-CM

## 2024-11-26 PROCEDURE — 76536 US EXAM OF HEAD AND NECK: CPT

## 2024-12-11 ENCOUNTER — APPOINTMENT (OUTPATIENT)
Dept: CARDIOLOGY | Facility: CLINIC | Age: 84
End: 2024-12-11
Payer: MEDICARE

## 2024-12-11 VITALS
DIASTOLIC BLOOD PRESSURE: 60 MMHG | WEIGHT: 199 LBS | HEART RATE: 78 BPM | BODY MASS INDEX: 33.15 KG/M2 | HEIGHT: 65 IN | SYSTOLIC BLOOD PRESSURE: 124 MMHG

## 2024-12-11 DIAGNOSIS — Z98.61 CORONARY ARTERIOSCLEROSIS AFTER PERCUTANEOUS TRANSLUMINAL CORONARY ANGIOPLASTY (PTCA): ICD-10-CM

## 2024-12-11 DIAGNOSIS — I10 ESSENTIAL HYPERTENSION: ICD-10-CM

## 2024-12-11 DIAGNOSIS — E78.2 MIXED HYPERLIPIDEMIA: ICD-10-CM

## 2024-12-11 DIAGNOSIS — I25.10 ASHD (ARTERIOSCLEROTIC HEART DISEASE): ICD-10-CM

## 2024-12-11 DIAGNOSIS — I25.10 CORONARY ARTERIOSCLEROSIS AFTER PERCUTANEOUS TRANSLUMINAL CORONARY ANGIOPLASTY (PTCA): ICD-10-CM

## 2024-12-11 DIAGNOSIS — Z98.61 S/P PTCA (PERCUTANEOUS TRANSLUMINAL CORONARY ANGIOPLASTY): ICD-10-CM

## 2024-12-11 DIAGNOSIS — Z87.891 FORMER SMOKER: ICD-10-CM

## 2024-12-11 PROCEDURE — 1036F TOBACCO NON-USER: CPT | Performed by: INTERNAL MEDICINE

## 2024-12-11 PROCEDURE — 3078F DIAST BP <80 MM HG: CPT | Performed by: INTERNAL MEDICINE

## 2024-12-11 PROCEDURE — 1159F MED LIST DOCD IN RCRD: CPT | Performed by: INTERNAL MEDICINE

## 2024-12-11 PROCEDURE — 99214 OFFICE O/P EST MOD 30 MIN: CPT | Performed by: INTERNAL MEDICINE

## 2024-12-11 PROCEDURE — 1160F RVW MEDS BY RX/DR IN RCRD: CPT | Performed by: INTERNAL MEDICINE

## 2024-12-11 PROCEDURE — 3074F SYST BP LT 130 MM HG: CPT | Performed by: INTERNAL MEDICINE

## 2024-12-11 RX ORDER — CLOPIDOGREL BISULFATE 75 MG/1
75 TABLET ORAL DAILY
Qty: 90 TABLET | Refills: 3 | Status: SHIPPED | OUTPATIENT
Start: 2024-12-11 | End: 2025-12-11

## 2024-12-11 RX ORDER — VALSARTAN 80 MG/1
80 TABLET ORAL DAILY
Qty: 90 TABLET | Refills: 3 | Status: SHIPPED | OUTPATIENT
Start: 2024-12-11 | End: 2025-12-11

## 2024-12-11 RX ORDER — ROSUVASTATIN CALCIUM 40 MG/1
40 TABLET, COATED ORAL DAILY
Qty: 90 TABLET | Refills: 3 | Status: SHIPPED | OUTPATIENT
Start: 2024-12-11 | End: 2025-12-11

## 2024-12-11 RX ORDER — FUROSEMIDE 20 MG/1
20 TABLET ORAL DAILY
Qty: 90 TABLET | Refills: 3 | Status: SHIPPED | OUTPATIENT
Start: 2024-12-11 | End: 2025-12-11

## 2024-12-11 NOTE — PROGRESS NOTES
"Subjective   Adele Wild is a 84 y.o. female       Chief Complaint    Follow-up          84-year-old female returns for follow-up she is doing well she denies any cardiovascular events, she remains with occasional shortness of breath that is variable in nature.  She was hospitalized in April 2024 with accelerated hypertension, bradycardia and what sounds like possibly diastolic heart failure.  She was hospitalized at [facility] we were not involved in this.  Her medications were changed, cluck carvedilol was discontinued and Entresto was initiated.    She has supranormal left ventricular function with ejection fraction of 88% and normal perfusion scan May 2023.  She did undergo primary PCI proximal RCA and diagonal branch in May 2023 for subsequent non-ST elevation MI event with preserved LV function.    She is a former smoker, quit 45 years ago; has underlying hypertension; has no major ambulatory limitations and remains on DAPT    Recommendations: Discontinue Entresto, switch over to valsartan 80 mg daily, follow-up with blood pressure check in 3 months with nurse practitioner and titrate accordingly, will follow-up with myself in 1 year         Review of Systems   All other systems reviewed and are negative.           Vitals:    12/11/24 1040   BP: 124/60   BP Location: Right arm   Patient Position: Sitting   Pulse: 78   Weight: 90.3 kg (199 lb)   Height: 1.651 m (5' 5\")        Objective   Physical Exam  Constitutional:       Appearance: Normal appearance.   HENT:      Nose: Nose normal.   Neck:      Vascular: No carotid bruit.   Cardiovascular:      Rate and Rhythm: Normal rate.      Pulses: Normal pulses.      Heart sounds: Normal heart sounds.   Pulmonary:      Effort: Pulmonary effort is normal.   Abdominal:      General: Bowel sounds are normal.      Palpations: Abdomen is soft.   Musculoskeletal:         General: Normal range of motion.      Cervical back: Normal range of motion.      Right lower leg: No " edema.      Left lower leg: No edema.   Skin:     General: Skin is warm and dry.   Neurological:      General: No focal deficit present.      Mental Status: She is alert.   Psychiatric:         Mood and Affect: Mood normal.         Behavior: Behavior normal.         Thought Content: Thought content normal.         Judgment: Judgment normal.         Allergies  Bactrim [sulfamethoxazole-trimethoprim] and Zithromax z-amy [azithromycin]     Current Medications    Current Outpatient Medications:     ALPRAZolam (Niravam) 0.25 mg disintegrating tablet, Dissolve 1 tablet (0.25 mg) in the mouth as needed at bedtime for anxiety., Disp: , Rfl:     aspirin 81 mg EC tablet, Take 1 tablet (81 mg) by mouth once daily., Disp: , Rfl:     clopidogrel (Plavix) 75 mg tablet, TAKE 8 TABLETS BY MOUTH ON DAY ONE THEN TAKE 1 TABLET BY MOUTH DAILY, Disp: 90 tablet, Rfl: 3    furosemide (Lasix) 20 mg tablet, Take 1 tablet (20 mg) by mouth once daily., Disp: , Rfl:     gabapentin (Neurontin) 100 mg capsule, Take 1 capsule (100 mg) by mouth 2 times a day., Disp: , Rfl:     NIFEdipine XL 30 mg 24 hr tablet, Take 1 tablet (30 mg) by mouth once daily., Disp: , Rfl:     pantoprazole (ProtoNix) 40 mg EC tablet, Take 1 tablet (40 mg) by mouth once daily in the morning. Take before meals. Do not crush, chew, or split., Disp: , Rfl:     potassium chloride CR 10 mEq ER tablet, Take 1 tablet (10 mEq) by mouth once daily. Do not crush, chew, or split., Disp: , Rfl:     rosuvastatin (Crestor) 40 mg tablet, Take 1 tablet (40 mg) by mouth once daily., Disp: , Rfl:     temazepam (Restoril) 15 mg capsule, Take 1 capsule (15 mg) by mouth as needed at bedtime for sleep., Disp: , Rfl:                      Assessment/Plan   1. ASHD (arteriosclerotic heart disease)  Follow Up In Cardiology      2. Essential hypertension        3. BMI 33.0-33.9,adult        4. Former smoker        5. S/P PTCA (percutaneous transluminal coronary angioplasty)        6. Mixed  hyperlipidemia        7. Coronary arteriosclerosis after percutaneous transluminal coronary angioplasty (PTCA)                 Scribe Attestation  By signing my name below, I, Shama CHAVARRIA LPN   , Scribe   attest that this documentation has been prepared under the direction and in the presence of Martínez Peters DO.     Provider Attestation - Scribe documentation    All medical record entries made by the Scribe were at my direction and personally dictated by me. I have reviewed the chart and agree that the record accurately reflects my personal performance of the history, physical exam, discussion and plan.

## 2024-12-11 NOTE — LETTER
December 11, 2024     Nabil Hayden DO  [address] [place] [address]    Patient: Adele Wild   YOB: 1940   Date of Visit: 12/11/2024       Dear Dr. Nabil Hayden DO:    Thank you for referring Adele Wild to me for evaluation. Below are my notes for this consultation.  If you have questions, please do not hesitate to call me. I look forward to following your patient along with you.       Sincerely,     Martínez Peters DO      CC: No Recipients  ______________________________________________________________________________________    Subjective   Adele Wild is a 84 y.o. female       Chief Complaint    Follow-up          84-year-old female returns for follow-up she is doing well she denies any cardiovascular events, she remains with occasional shortness of breath that is variable in nature.  She was hospitalized in April 2024 with accelerated hypertension, bradycardia and what sounds like possibly diastolic heart failure.  She was hospitalized at [facility] we were not involved in this.  Her medications were changed, cluck carvedilol was discontinued and Entresto was initiated.    She has supranormal left ventricular function with ejection fraction of 88% and normal perfusion scan May 2023.  She did undergo primary PCI proximal RCA and diagonal branch in May 2023 for subsequent non-ST elevation MI event with preserved LV function.    She is a former smoker, quit 45 years ago; has underlying hypertension; has no major ambulatory limitations and remains on DAPT    Recommendations: Discontinue Entresto, switch over to valsartan 80 mg daily, follow-up with blood pressure check in 3 months with nurse practitioner and titrate accordingly, will follow-up with myself in 1 year         Review of Systems   All other systems reviewed and are negative.           Vitals:    12/11/24 1040   BP: 124/60   BP Location: Right arm   Patient Position: Sitting   Pulse: 78   Weight: 90.3 kg (199 lb)  "  Height: 1.651 m (5' 5\")        Objective   Physical Exam  Constitutional:       Appearance: Normal appearance.   HENT:      Nose: Nose normal.   Neck:      Vascular: No carotid bruit.   Cardiovascular:      Rate and Rhythm: Normal rate.      Pulses: Normal pulses.      Heart sounds: Normal heart sounds.   Pulmonary:      Effort: Pulmonary effort is normal.   Abdominal:      General: Bowel sounds are normal.      Palpations: Abdomen is soft.   Musculoskeletal:         General: Normal range of motion.      Cervical back: Normal range of motion.      Right lower leg: No edema.      Left lower leg: No edema.   Skin:     General: Skin is warm and dry.   Neurological:      General: No focal deficit present.      Mental Status: She is alert.   Psychiatric:         Mood and Affect: Mood normal.         Behavior: Behavior normal.         Thought Content: Thought content normal.         Judgment: Judgment normal.         Allergies  Bactrim [sulfamethoxazole-trimethoprim] and Zithromax z-amy [azithromycin]     Current Medications    Current Outpatient Medications:   •  ALPRAZolam (Niravam) 0.25 mg disintegrating tablet, Dissolve 1 tablet (0.25 mg) in the mouth as needed at bedtime for anxiety., Disp: , Rfl:   •  aspirin 81 mg EC tablet, Take 1 tablet (81 mg) by mouth once daily., Disp: , Rfl:   •  clopidogrel (Plavix) 75 mg tablet, TAKE 8 TABLETS BY MOUTH ON DAY ONE THEN TAKE 1 TABLET BY MOUTH DAILY, Disp: 90 tablet, Rfl: 3  •  furosemide (Lasix) 20 mg tablet, Take 1 tablet (20 mg) by mouth once daily., Disp: , Rfl:   •  gabapentin (Neurontin) 100 mg capsule, Take 1 capsule (100 mg) by mouth 2 times a day., Disp: , Rfl:   •  NIFEdipine XL 30 mg 24 hr tablet, Take 1 tablet (30 mg) by mouth once daily., Disp: , Rfl:   •  pantoprazole (ProtoNix) 40 mg EC tablet, Take 1 tablet (40 mg) by mouth once daily in the morning. Take before meals. Do not crush, chew, or split., Disp: , Rfl:   •  potassium chloride CR 10 mEq ER tablet, " Take 1 tablet (10 mEq) by mouth once daily. Do not crush, chew, or split., Disp: , Rfl:   •  rosuvastatin (Crestor) 40 mg tablet, Take 1 tablet (40 mg) by mouth once daily., Disp: , Rfl:   •  temazepam (Restoril) 15 mg capsule, Take 1 capsule (15 mg) by mouth as needed at bedtime for sleep., Disp: , Rfl:                      Assessment/Plan   1. ASHD (arteriosclerotic heart disease)  Follow Up In Cardiology      2. Essential hypertension        3. BMI 33.0-33.9,adult        4. Former smoker        5. S/P PTCA (percutaneous transluminal coronary angioplasty)        6. Mixed hyperlipidemia        7. Coronary arteriosclerosis after percutaneous transluminal coronary angioplasty (PTCA)                 Scribe Attestation  By signing my name below, IShama LPN   , Scribe   attest that this documentation has been prepared under the direction and in the presence of Martínez Peters DO.     Provider Attestation - Scribe documentation    All medical record entries made by the Scribe were at my direction and personally dictated by me. I have reviewed the chart and agree that the record accurately reflects my personal performance of the history, physical exam, discussion and plan.

## 2024-12-11 NOTE — PATIENT INSTRUCTIONS
Please bring all medicines, vitamins, and herbal supplements with you when you come to the office.    Prescriptions will not be filled unless you are compliant with your follow up appointments or have a follow up appointment scheduled as per instruction of your physician. Refills should be requested at the time of your visit.   BMI was above normal measurement. Current weight: 90.3 kg (199 lb)  Weight change since last visit (-) denotes wt loss 0.8 lbs   Weight loss needed to achieve BMI 25: 49.1 Lbs  Weight loss needed to achieve BMI 30: 19.1 Lbs  Provided instructions on dietary changes  Provided instructions on exercise.

## 2025-02-20 ENCOUNTER — HOSPITAL ENCOUNTER
Dept: HOSPITAL 101 - LAB | Age: 85
Discharge: HOME | End: 2025-02-20
Payer: MEDICARE

## 2025-02-20 DIAGNOSIS — E03.8: Primary | ICD-10-CM

## 2025-02-20 DIAGNOSIS — N18.9: ICD-10-CM

## 2025-02-20 LAB
ANION GAP: 11.7
BLOOD UREA NITROGEN: 28 MG/DL (ref 7–18)
CALCIUM: 8.9 MG/DL (ref 8.5–10.1)
CARBON DIOXIDE: 30.4 MMOL/L (ref 21–32)
CHLORIDE: 105 MMOL/L (ref 98–107)
CO2 BLD-SCNC: 30.4 MMOL/L (ref 21–32)
ESTIMATED GFR (AFRICAN AMERICA: 38
ESTIMATED GFR (NON-AFRICAN AME: 32
GLUCOSE BLD-MCNC: 119 MG/DL (ref 74–106)
POTASSIUM SERPLBLD-SCNC: 4.1 MMOL/L (ref 3.5–5.1)
POTASSIUM: 4.1 MMOL/L (ref 3.5–5.1)
SODIUM BLD-SCNC: 143 MMOL/L (ref 136–145)
SODIUM: 143 MMOL/L (ref 136–145)
THYROID STIMULATING HORMONE: 2.65 UIU/ML (ref 0.36–3.74)

## 2025-02-20 PROCEDURE — 84439 ASSAY OF FREE THYROXINE: CPT

## 2025-02-20 PROCEDURE — 36415 COLL VENOUS BLD VENIPUNCTURE: CPT

## 2025-02-20 PROCEDURE — 80048 BASIC METABOLIC PNL TOTAL CA: CPT

## 2025-02-20 PROCEDURE — 84443 ASSAY THYROID STIM HORMONE: CPT

## 2025-03-10 ENCOUNTER — APPOINTMENT (OUTPATIENT)
Dept: CARDIOLOGY | Facility: CLINIC | Age: 85
End: 2025-03-10
Payer: MEDICARE

## 2025-03-17 ENCOUNTER — APPOINTMENT (OUTPATIENT)
Dept: CARDIOLOGY | Facility: CLINIC | Age: 85
End: 2025-03-17
Payer: MEDICARE

## 2025-03-17 VITALS
HEART RATE: 60 BPM | SYSTOLIC BLOOD PRESSURE: 124 MMHG | BODY MASS INDEX: 33.32 KG/M2 | WEIGHT: 200 LBS | HEIGHT: 65 IN | DIASTOLIC BLOOD PRESSURE: 60 MMHG

## 2025-03-17 DIAGNOSIS — E78.2 MIXED HYPERLIPIDEMIA: ICD-10-CM

## 2025-03-17 DIAGNOSIS — I10 ESSENTIAL HYPERTENSION: ICD-10-CM

## 2025-03-17 DIAGNOSIS — I25.10 ASHD (ARTERIOSCLEROTIC HEART DISEASE): ICD-10-CM

## 2025-03-17 DIAGNOSIS — R00.2 PALPITATIONS: ICD-10-CM

## 2025-03-17 PROCEDURE — 3074F SYST BP LT 130 MM HG: CPT | Performed by: NURSE PRACTITIONER

## 2025-03-17 PROCEDURE — 1036F TOBACCO NON-USER: CPT | Performed by: NURSE PRACTITIONER

## 2025-03-17 PROCEDURE — 1160F RVW MEDS BY RX/DR IN RCRD: CPT | Performed by: NURSE PRACTITIONER

## 2025-03-17 PROCEDURE — 3078F DIAST BP <80 MM HG: CPT | Performed by: NURSE PRACTITIONER

## 2025-03-17 PROCEDURE — 99214 OFFICE O/P EST MOD 30 MIN: CPT | Performed by: NURSE PRACTITIONER

## 2025-03-17 PROCEDURE — 1159F MED LIST DOCD IN RCRD: CPT | Performed by: NURSE PRACTITIONER

## 2025-03-17 RX ORDER — BUDESONIDE AND FORMOTEROL FUMARATE DIHYDRATE 160; 4.5 UG/1; UG/1
2 AEROSOL RESPIRATORY (INHALATION) EVERY 12 HOURS
COMMUNITY
Start: 2025-03-05

## 2025-03-17 ASSESSMENT — ENCOUNTER SYMPTOMS
PND: 0
NEAR-SYNCOPE: 0
DYSPNEA ON EXERTION: 1
IRREGULAR HEARTBEAT: 0
PALPITATIONS: 1
ORTHOPNEA: 0
SYNCOPE: 0

## 2025-03-17 NOTE — LETTER
"March 17, 2025     Nabil Hayden DO  [address] [place] [address]    Patient: Adele Wild   YOB: 1940   Date of Visit: 3/17/2025       Dear Dr. Nabil Hayden DO:    Thank you for referring Adele Wild to me for evaluation. Below are my notes for this consultation.  If you have questions, please do not hesitate to call me. I look forward to following your patient along with you.       Sincerely,     Alexia Lenz, APRN-CNP      CC: No Recipients  ______________________________________________________________________________________    Chief Complaint  My legs are swelling up and I am having palpitations\"    Reason for Visit  3-month follow-up.  Patient presents to the office today for outpatient follow-up for hypertension management and medication change.  Last evaluated in clinic by Dr. Peters December 2024.  At that time, discontinued Entresto and initiated on valsartan.  Repeat potassium 4.1, creatinine 1.5.  Patient has been compliant with changes.    Presents today ambulatory with steady gait.  Accompanied by  daughter.  Patient denies any hospitalizations or significant changes to interval medical history since last office follow-up.     History of Present Illness   Patient is a very pleasant 84-year-old female who presents today for hypertension management.  She voices concerns regarding increasing lower extremity edema and frequent palpitations.  She is utilizing compression stockings and is actually maintained on Lasix.  Upon questioning edema is clearly dependent in nature.  She reports palpitations where she can feel her heart\" thumping and beating\" and occasionally it will radiate up into her neck.  This seems to happen on an almost daily basis.  Denies dizziness or lightheadedness.    From an activity standpoint she goes up and down steps to the laundry on a regular basis, does housework and goes to the grocery store.  She pushes the cart through the grocery store.  She " "is unable to recall her prior PCI symptom (did not have NSTEMI it was noted on abnormal stress test).  She complains of dyspnea on exertion for approximately 3 months with activity like rushing to the garage or going outside in the cold.  Her PCP started her on albuterol inhaler and does not seem to have any type of benefit.    She is an extremely difficult historian.  Does agree to complete Holter monitor because we do not miss any atrial fibrillation.  Otherwise in regards to this dyspnea on exertion over the last 3 months we will recheck echo for LVH, diastolic dysfunction and LVEF.  Her prior beta-blocker was discontinued due to bradycardia.  If it the Holter average heart rate is okay may need to consider resuming.    Patient reports that overall has no complaint(s) of chest pain, chest pressure/discomfort, claudication, exertional chest pressure/discomfort, fatigue, irregular heart beat, and lower extremity edema    Daily activity:    4 METs  Denies any change in exercise capacity or functional tolerance since last office visit.    The importance of secondary prevention reviewed:  HTN: Optimal  HLD: Treated  DM: Denies  Smoker: Denies  BMI:  Reviewed the merits of healthy lifestyle choices on overall cardiovascular health.    She is now 20 months outside of PCI.  Although her med list reports aspirin and Plavix she discontinued baby aspirin a couple months ago due to excessive bruisability.  Will continue Plavix only.    Review of Systems   Cardiovascular:  Positive for dyspnea on exertion and palpitations. Negative for chest pain, irregular heartbeat, leg swelling, near-syncope, orthopnea, paroxysmal nocturnal dyspnea and syncope.        Visit Vitals  /60 (BP Location: Left arm, Patient Position: Sitting)   Pulse 60   Ht 1.651 m (5' 5\")   Wt 90.7 kg (200 lb)   BMI 33.28 kg/m²   Smoking Status Former   BSA 2.04 m²     Physical Exam  Vitals and nursing note reviewed.   HENT:      Head: Normocephalic. " "  Cardiovascular:      Rate and Rhythm: Normal rate and regular rhythm.      Heart sounds: Normal heart sounds.   Pulmonary:      Effort: Pulmonary effort is normal.      Breath sounds: Normal breath sounds.   Abdominal:      Palpations: Abdomen is soft.   Musculoskeletal:      Right lower leg: No edema.      Left lower leg: No edema.   Skin:     General: Skin is warm and dry.   Neurological:      General: No focal deficit present.      Mental Status: She is alert.   Psychiatric:         Mood and Affect: Mood normal.         Behavior: Behavior normal.        Allergies   Allergen Reactions   • Bactrim [Sulfamethoxazole-Trimethoprim] Hives   • Zithromax Z-Christiano [Azithromycin] Swelling     Tongue swelling       Current Outpatient Medications   Medication Instructions   • ALPRAZolam (NIRAVAM) 0.25 mg, Nightly PRN   • clopidogrel (PLAVIX) 75 mg, oral, Daily   • furosemide (LASIX) 20 mg, oral, Daily   • gabapentin (NEURONTIN) 100 mg, 2 times daily   • NIFEdipine ER (ADALAT CC) 30 mg, Daily   • pantoprazole (PROTONIX) 40 mg, Daily before breakfast   • potassium chloride CR 10 mEq ER tablet 10 mEq, Daily   • rosuvastatin (CRESTOR) 40 mg, oral, Daily   • Symbicort 160-4.5 mcg/actuation inhaler 2 puffs, Every 12 hours   • temazepam (RESTORIL) 15 mg, Nightly PRN   • valsartan (DIOVAN) 80 mg, oral, Daily      Assessment:    Essential hypertension  Optimal in office    Palpitations  Reports fairly daily episodes of\" hearing my heart thumping going into my ears\"  No prior documented A-fib or arrhythmia.    Was taken off of carvedilol April 2024 hospitalization due to bradycardia    ASHD (arteriosclerotic heart disease)  May 12, 2023   Mid/proximal RCA PCI/[place]  4x15mm & 4x18mm  Proximal diagonal PCI/[place] 2.5 x 18 mm  LAD 10%  Circumflex normal  LVEF 65%     May 2024 MPI ischemia, EF 88%    Current daily activity at 4 METS without concerning symptoms    Hyperlipidemia  High intensity statin  April 2024 HDL 42, cholesterol 158 "       Cardiac and Vasculature  April 2024 TTE  LVEF greater than 55%  Biatrial enlargement mild    Plan:     Through informed decision making process incorporating patients unique circumstances, the following treatment plan will be initiated:    1.  Prescription drug management of cardiovascular medication for efficacy, adherence to treatment, side effect assessment and polypharmacy. Current treatment clinically warranted and to continue without modifications.    2.  Stay off ASA as you have been doing    3. 48 hour holter (palpitations)    4.  Return for follow-up; in the interim, contact the office if new symptoms arise.  Dr. Peters as scheduled unless abnormal findings    5.  Echo (ALVAREZ, diastolic dysfunction)    Alexia Lenz MSN, APRN-CNP, PMHNP-[facility] [place]  [place], [place]     Please excuse any errors in grammar or translation related to this dictation. Voice recognition software was utilized to prepare this document.

## 2025-03-17 NOTE — ASSESSMENT & PLAN NOTE
May 12, 2023   Mid/proximal RCA PCI/[place]  4x15mm & 4x18mm  Proximal diagonal PCI/[place] 2.5 x 18 mm  LAD 10%  Circumflex normal  LVEF 65%     May 2024 MPI ischemia, EF 88%    Current daily activity at 4 METS without concerning symptoms

## 2025-03-17 NOTE — PATIENT INSTRUCTIONS
Please bring all medicines, vitamins, and herbal supplements with you when you come to the office.    Prescriptions will not be filled unless you are compliant with your follow up appointments or have a follow up appointment scheduled as per instruction of your physician. Refills should be requested at the time of your visit.     PLAN:   Through informed decision making process incorporating patients unique circumstances, the following treatment plan will be initiated:    1.  Prescription drug management of cardiovascular medication for efficacy, adherence to treatment, side effect assessment and polypharmacy. Current treatment clinically warranted and to continue without modifications.    2.  Stay off ASA as you have been doing    3. 48 hour holter (palpitations)    4.  Return for follow-up; in the interim, contact the office if new symptoms arise.  Dr. Peters as scheduled unless abnormal findings    5.  Echo (ALVAREZ, diastolic dysfunction)

## 2025-03-17 NOTE — PROGRESS NOTES
"Chief Complaint  My legs are swelling up and I am having palpitations\"    Reason for Visit  3-month follow-up.  Patient presents to the office today for outpatient follow-up for hypertension management and medication change.  Last evaluated in clinic by Dr. Peters December 2024.  At that time, discontinued Entresto and initiated on valsartan.  Repeat potassium 4.1, creatinine 1.5.  Patient has been compliant with changes.    Presents today ambulatory with steady gait.  Accompanied by  daughter.  Patient denies any hospitalizations or significant changes to interval medical history since last office follow-up.     History of Present Illness   Patient is a very pleasant 84-year-old female who presents today for hypertension management.  She voices concerns regarding increasing lower extremity edema and frequent palpitations.  She is utilizing compression stockings and is actually maintained on Lasix.  Upon questioning edema is clearly dependent in nature.  She reports palpitations where she can feel her heart\" thumping and beating\" and occasionally it will radiate up into her neck.  This seems to happen on an almost daily basis.  Denies dizziness or lightheadedness.    From an activity standpoint she goes up and down steps to the laundry on a regular basis, does housework and goes to the grocery store.  She pushes the cart through the grocery store.  She is unable to recall her prior PCI symptom (did not have NSTEMI it was noted on abnormal stress test).  She complains of dyspnea on exertion for approximately 3 months with activity like rushing to the garage or going outside in the cold.  Her PCP started her on albuterol inhaler and does not seem to have any type of benefit.    She is an extremely difficult historian.  Does agree to complete Holter monitor because we do not miss any atrial fibrillation.  Otherwise in regards to this dyspnea on exertion over the last 3 months we will recheck echo for LVH, diastolic " "dysfunction and LVEF.  Her prior beta-blocker was discontinued due to bradycardia.  If it the Holter average heart rate is okay may need to consider resuming.    Patient reports that overall has no complaint(s) of chest pain, chest pressure/discomfort, claudication, exertional chest pressure/discomfort, fatigue, irregular heart beat, and lower extremity edema    Daily activity:    4 METs  Denies any change in exercise capacity or functional tolerance since last office visit.    The importance of secondary prevention reviewed:  HTN: Optimal  HLD: Treated  DM: Denies  Smoker: Denies  BMI:  Reviewed the merits of healthy lifestyle choices on overall cardiovascular health.    She is now 20 months outside of PCI.  Although her med list reports aspirin and Plavix she discontinued baby aspirin a couple months ago due to excessive bruisability.  Will continue Plavix only.    Review of Systems   Cardiovascular:  Positive for dyspnea on exertion and palpitations. Negative for chest pain, irregular heartbeat, leg swelling, near-syncope, orthopnea, paroxysmal nocturnal dyspnea and syncope.        Visit Vitals  /60 (BP Location: Left arm, Patient Position: Sitting)   Pulse 60   Ht 1.651 m (5' 5\")   Wt 90.7 kg (200 lb)   BMI 33.28 kg/m²   Smoking Status Former   BSA 2.04 m²     Physical Exam  Vitals and nursing note reviewed.   HENT:      Head: Normocephalic.   Cardiovascular:      Rate and Rhythm: Normal rate and regular rhythm.      Heart sounds: Normal heart sounds.   Pulmonary:      Effort: Pulmonary effort is normal.      Breath sounds: Normal breath sounds.   Abdominal:      Palpations: Abdomen is soft.   Musculoskeletal:      Right lower leg: No edema.      Left lower leg: No edema.   Skin:     General: Skin is warm and dry.   Neurological:      General: No focal deficit present.      Mental Status: She is alert.   Psychiatric:         Mood and Affect: Mood normal.         Behavior: Behavior normal.        Allergies " "  Allergen Reactions    Bactrim [Sulfamethoxazole-Trimethoprim] Hives    Zithromax Z-Christiano [Azithromycin] Swelling     Tongue swelling       Current Outpatient Medications   Medication Instructions    ALPRAZolam (NIRAVAM) 0.25 mg, Nightly PRN    clopidogrel (PLAVIX) 75 mg, oral, Daily    furosemide (LASIX) 20 mg, oral, Daily    gabapentin (NEURONTIN) 100 mg, 2 times daily    NIFEdipine ER (ADALAT CC) 30 mg, Daily    pantoprazole (PROTONIX) 40 mg, Daily before breakfast    potassium chloride CR 10 mEq ER tablet 10 mEq, Daily    rosuvastatin (CRESTOR) 40 mg, oral, Daily    Symbicort 160-4.5 mcg/actuation inhaler 2 puffs, Every 12 hours    temazepam (RESTORIL) 15 mg, Nightly PRN    valsartan (DIOVAN) 80 mg, oral, Daily      Assessment:    Essential hypertension  Optimal in office    Palpitations  Reports fairly daily episodes of\" hearing my heart thumping going into my ears\"  No prior documented A-fib or arrhythmia.    Was taken off of carvedilol April 2024 hospitalization due to bradycardia    ASHD (arteriosclerotic heart disease)  May 12, 2023   Mid/proximal RCA PCI/[place]  4x15mm & 4x18mm  Proximal diagonal PCI/[place] 2.5 x 18 mm  LAD 10%  Circumflex normal  LVEF 65%     May 2024 MPI ischemia, EF 88%    Current daily activity at 4 METS without concerning symptoms    Hyperlipidemia  High intensity statin  April 2024 HDL 42, cholesterol 158       Cardiac and Vasculature  April 2024 TTE  LVEF greater than 55%  Biatrial enlargement mild    Plan:     Through informed decision making process incorporating patients unique circumstances, the following treatment plan will be initiated:    1.  Prescription drug management of cardiovascular medication for efficacy, adherence to treatment, side effect assessment and polypharmacy. Current treatment clinically warranted and to continue without modifications.    2.  Stay off ASA as you have been doing    3. 48 hour holter (palpitations)    4.  Return for follow-up; in the interim, " contact the office if new symptoms arise.  Dr. Peters as scheduled unless abnormal findings    5.  Echo (ALVAREZ, diastolic dysfunction)    Alexia Lenz MSN, APRN-CNP, PMHNP-[facility] [place], [place]     Please excuse any errors in grammar or translation related to this dictation. Voice recognition software was utilized to prepare this document.

## 2025-03-17 NOTE — ASSESSMENT & PLAN NOTE
"Reports fairly daily episodes of\" hearing my heart thumping going into my ears\"  No prior documented A-fib or arrhythmia.    Was taken off of carvedilol April 2024 hospitalization due to bradycardia  "

## 2025-03-20 ENCOUNTER — HOSPITAL ENCOUNTER
Dept: HOSPITAL 101 - MAMMO | Age: 85
Discharge: HOME | End: 2025-03-20
Payer: MEDICARE

## 2025-03-20 DIAGNOSIS — Z80.7: ICD-10-CM

## 2025-03-20 DIAGNOSIS — Z80.0: ICD-10-CM

## 2025-03-20 DIAGNOSIS — Z12.31: Primary | ICD-10-CM

## 2025-03-20 DIAGNOSIS — Z80.1: ICD-10-CM

## 2025-03-20 DIAGNOSIS — Z80.52: ICD-10-CM

## 2025-03-20 PROCEDURE — 77067 SCR MAMMO BI INCL CAD: CPT

## 2025-03-20 PROCEDURE — 77063 BREAST TOMOSYNTHESIS BI: CPT

## 2025-03-28 ENCOUNTER — HOSPITAL ENCOUNTER
Dept: HOSPITAL 101 - LAB | Age: 85
Discharge: HOME | End: 2025-03-28
Payer: MEDICARE

## 2025-03-28 DIAGNOSIS — N18.32: ICD-10-CM

## 2025-03-28 DIAGNOSIS — M25.561: Primary | ICD-10-CM

## 2025-03-28 DIAGNOSIS — I12.9: ICD-10-CM

## 2025-03-28 LAB
ANION GAP: 14.9
BLOOD UREA NITROGEN: 21 MG/DL (ref 7–18)
CALCIUM: 8.9 MG/DL (ref 8.5–10.1)
CARBON DIOXIDE: 28 MMOL/L (ref 21–32)
CHLORIDE: 106 MMOL/L (ref 98–107)
CO2 BLD-SCNC: 28 MMOL/L (ref 21–32)
ESTIMATED GFR (AFRICAN AMERICA: 43
ESTIMATED GFR (NON-AFRICAN AME: 35
GLUCOSE BLD-MCNC: 116 MG/DL (ref 74–106)
POTASSIUM SERPLBLD-SCNC: 3.9 MMOL/L (ref 3.5–5.1)
POTASSIUM: 3.9 MMOL/L (ref 3.5–5.1)
SODIUM BLD-SCNC: 145 MMOL/L (ref 136–145)
SODIUM: 145 MMOL/L (ref 136–145)

## 2025-03-28 PROCEDURE — 36415 COLL VENOUS BLD VENIPUNCTURE: CPT

## 2025-03-28 PROCEDURE — 73564 X-RAY EXAM KNEE 4 OR MORE: CPT

## 2025-03-28 PROCEDURE — 80048 BASIC METABOLIC PNL TOTAL CA: CPT

## 2025-03-31 ENCOUNTER — APPOINTMENT (OUTPATIENT)
Dept: CARDIOLOGY | Facility: CLINIC | Age: 85
End: 2025-03-31
Payer: MEDICARE

## 2025-03-31 DIAGNOSIS — R00.2 PALPITATIONS: ICD-10-CM

## 2025-04-09 PROCEDURE — 93227 XTRNL ECG REC<48 HR R&I: CPT | Performed by: INTERNAL MEDICINE

## 2025-04-10 ENCOUNTER — TELEPHONE (OUTPATIENT)
Dept: CARDIOLOGY | Facility: CLINIC | Age: 85
End: 2025-04-10
Payer: MEDICARE

## 2025-04-10 NOTE — TELEPHONE ENCOUNTER
Result Communication    Resulted Orders   Holter Or Event Cardiac Monitor    Narrative    1-the rhythm was sinus throughout the recording with an average heart rate   of 62 bpm.  The minimum heart rate was 41 bpm sinus bradycardia at 10:08   AM and the maximal heart rate was 113 bpm sinus tachycardia at 8:33 AM  2-rare isolated PVCs, there was 15 beats in 48 hours  3-small volume of premature atrial complexes, the total number was 417   beats in 48 hours.  This included very short runs of 3-5 beats of atrial   tachycardia/atrial fibrillation at a maximal speed of 146 bpm which were   not symptomatic  4-no pauses exceeding 2 seconds were seen  5-no significant symptoms on the patient's diary    Conclusion:    Unremarkable 48-hour Holter monitor, it demonstrated normal sinus rhythm   mechanism throughout average heart rate 62 bpm.  Rare isolated PVCs and   infrequent mostly isolated PACs with few short runs of atrial   tachycardia/atrial fibrillation lasting 3 to 5 beats at a maximal speed of   146 bpm.  No significant symptoms were noted on the diary and the longest   pause was 1.6 seconds which is unremarkable         3:40 PM      Results were successfully communicated with the patient and they acknowledged their understanding.

## 2025-04-10 NOTE — TELEPHONE ENCOUNTER
----- Message from Alexia Lenz sent at 4/10/2025 12:34 PM EDT -----  Please let her know Holter monitor did not show any clear atrial fibrillation.  Her heart rate does have a tendency to be rather slow and hesitant to initiate any additional treatment at this time.  Please let me know if she continues to have worsening palpitations.  ----- Message -----  From: Abraham Richter MD  Sent: 4/9/2025   5:37 PM EDT  To: COLETTE Conner-CNP

## 2025-04-15 ENCOUNTER — HOSPITAL ENCOUNTER
Dept: HOSPITAL 101 - US | Age: 85
Discharge: HOME | End: 2025-04-15
Payer: MEDICARE

## 2025-04-15 DIAGNOSIS — E04.1: Primary | ICD-10-CM

## 2025-04-15 PROCEDURE — 76536 US EXAM OF HEAD AND NECK: CPT

## 2025-05-20 ENCOUNTER — HOSPITAL ENCOUNTER (OUTPATIENT)
Dept: CARDIOLOGY | Facility: CLINIC | Age: 85
Discharge: HOME | End: 2025-05-20
Payer: MEDICARE

## 2025-05-20 VITALS
BODY MASS INDEX: 33.32 KG/M2 | DIASTOLIC BLOOD PRESSURE: 60 MMHG | WEIGHT: 200 LBS | HEIGHT: 65 IN | SYSTOLIC BLOOD PRESSURE: 120 MMHG

## 2025-05-20 DIAGNOSIS — I10 ESSENTIAL HYPERTENSION: ICD-10-CM

## 2025-05-20 DIAGNOSIS — R00.2 PALPITATIONS: ICD-10-CM

## 2025-05-20 PROCEDURE — 93306 TTE W/DOPPLER COMPLETE: CPT

## 2025-05-20 PROCEDURE — 93306 TTE W/DOPPLER COMPLETE: CPT | Performed by: INTERNAL MEDICINE

## 2025-05-21 ENCOUNTER — HOSPITAL ENCOUNTER
Dept: HOSPITAL 101 - LAB | Age: 85
Discharge: HOME | End: 2025-05-21
Payer: MEDICARE

## 2025-05-21 DIAGNOSIS — I87.2: Primary | ICD-10-CM

## 2025-05-21 DIAGNOSIS — E03.8: ICD-10-CM

## 2025-05-21 LAB
ANION GAP: 13.7
AORTIC VALVE MEAN GRADIENT: 4 MMHG
AORTIC VALVE PEAK VELOCITY: 1.4 M/S
AV PEAK GRADIENT: 8 MMHG
AVA (PEAK VEL): 1.99 CM2
AVA (VTI): 2.13 CM2
CALCIUM: 8.9 MG/DL (ref 8.5–10.1)
CHLORIDE: 105 MMOL/L (ref 98–107)
CO2 BLD-SCNC: 30.7 MMOL/L (ref 21–32)
EJECTION FRACTION APICAL 4 CHAMBER: 64.6
EJECTION FRACTION: 63 %
ESTIMATED GFR (AFRICAN AMERICA: 37
ESTIMATED GFR (NON-AFRICAN AME: 31
GLUCOSE SERPLBLD-MCNC: 112 MG/DL (ref 74–106)
LEFT ATRIUM VOLUME AREA LENGTH INDEX BSA: 42.1 ML/M2
LEFT VENTRICLE INTERNAL DIMENSION DIASTOLE: 4.11 CM (ref 3.5–6)
LEFT VENTRICULAR OUTFLOW TRACT DIAMETER: 2.06 CM
LV EJECTION FRACTION BIPLANE: 55 %
MITRAL VALVE E/A RATIO: 1.48
MITRAL VALVE E/E' RATIO: 17.94
POTASSIUM SERPLBLD-SCNC: 4.4 MMOL/L (ref 3.5–5.1)
RIGHT VENTRICLE FREE WALL PEAK S': 16.27 CM/S
RIGHT VENTRICLE PEAK SYSTOLIC PRESSURE: 31.5 MMHG
SODIUM BLD-SCNC: 145 MMOL/L (ref 136–145)
TSH W/ REFLEX FT4: 3.66 UIU/ML (ref 0.36–3.74)

## 2025-05-21 PROCEDURE — 84443 ASSAY THYROID STIM HORMONE: CPT

## 2025-05-21 PROCEDURE — 80048 BASIC METABOLIC PNL TOTAL CA: CPT

## 2025-05-21 PROCEDURE — 36415 COLL VENOUS BLD VENIPUNCTURE: CPT

## 2025-05-22 ENCOUNTER — TELEPHONE (OUTPATIENT)
Dept: CARDIOLOGY | Facility: CLINIC | Age: 85
End: 2025-05-22
Payer: MEDICARE

## 2025-05-22 NOTE — TELEPHONE ENCOUNTER
Result Communication    Resulted Orders   Transthoracic Echo Complete   Result Value Ref Range    AV mn grad 4 mmHg    AV pk cara 1.40 m/s    LV Biplane EF 55 %    LVOT diam 2.06 cm    MV E/A ratio 1.48     MV avg E/e' ratio 17.94     LA vol index A/L 42.1 ml/m2    LV EF 63 %    RV free wall pk S' 16.27 cm/s    RVSP 31.5 mmHg    LVIDd 4.11 cm    Aortic Valve Area by Continuity of Peak Velocity 1.99 cm2    AV pk grad 8 mmHg    Aortic Valve Area by Continuity of VTI 2.13 cm2    LV A4C EF 64.6     Narrative                   [facility]  [address], [address], [place] [address]          Tel [phone] Fax [phone]    TRANSTHORACIC ECHOCARDIOGRAM REPORT    Patient Name:        CHAITANYABERKLEY Kelsey Physician:    [id number] Abraham Richter MD,                                                                  [facility]  Study Date:          5/20/2025            Ordering Provider:    [id number] ANDREW GASTELUM  MRN/PID:             [id number]             Fellow:  Accession#:          [id number]         Nurse:  Date of Birth/Age:   1940 / 84      Sonographer:          Tri guardado                                      RDCS, RVT  Gender Assigned at   F                    Additional Staff:  Birth:  Height:              165.10 cm            Admit Date:  Weight:              90.72 kg             Admission Status:  BSA / BMI:           1.98 m2 / 33.28      Department Location:  [facility]                       kg/m2                                      Heart Segun  Blood Pressure: 120 /64 mmHg    Study Type:    TRANSTHORACIC ECHO (TTE) COMPLETE  Diagnosis/ICD: Essential (primary) hypertension-I10; Palpitations-R00.2  Indication:    CAD, PTCA-2023, Edema, Hyperlipidemia, Former Smoker  CPT Codes:     Echo Complete w Full Doppler-[id number]   Study Detail:  The following Echo studies were performed: 2D, M-Mode, Doppler and                color flow.       PHYSICIAN INTERPRETATION:  Left Ventricle: Left ventricular ejection fraction is normal, by visual estimate at 60-65%. There are no regional wall motion abnormalities. The left ventricular cavity size is normal. There is left ventricular concentric remodeling. Spectral Doppler shows a Grade II (pseudonormal pattern) of left ventricular diastolic filling with an elevated left atrial pressure.  Left Atrium: The left atrium is upper limits of normal in size.  Right Ventricle: The right ventricle is normal in size. There is normal right ventricular global systolic function.  Right Atrium: The right atrial size is normal.  Aortic Valve: The aortic valve is trileaflet. The aortic valve dimensionless index is 0.64. There is no evidence of aortic valve regurgitation. The peak instantaneous gradient of the aortic valve is 8 mmHg. The mean gradient of the aortic valve is 4 mmHg.  Mitral Valve: The mitral valve is mildly thickened. The peak instantaneous gradient of the mitral valve is 7 mmHg. There is mild to moderate mitral valve regurgitation.  Tricuspid Valve: The tricuspid valve is structurally normal. There is mild tricuspid regurgitation.  Pulmonic Valve: The pulmonic valve is structurally normal. There is no indication of pulmonic valve regurgitation.  Pericardium: No pericardial effusion noted.  Aorta: The aortic root is normal.  Systemic Veins: The inferior vena cava appears normal in size.  In comparison to the previous echocardiogram(s): When compared to study from 4/13/2023, the previously reported pulmonary hypertension has resolved, otherwise no changes.       CONCLUSIONS:   1. Left ventricular ejection fraction is normal, by visual estimate at 60-65%.   2. Spectral Doppler shows a Grade II (pseudonormal pattern) of left ventricular diastolic filling with an elevated left atrial pressure.   3. There is normal  right ventricular global systolic function.   4. Mild to moderate mitral valve regurgitation.   5. When compared to study from 2023, the previously reported pulmonary hypertension has resolved, otherwise no changes.    QUANTITATIVE DATA SUMMARY:     2D MEASUREMENTS:             Normal Ranges:  Ao Root s:       2.80 cm  LAs:             3.98 cm     (2.7-4.0cm)  RVIDd:           3.57 cm     (0.9-3.6cm)  IVSd:            1.19 cm     (0.6-1.1cm)  LVPWd:           1.00 cm     (0.6-1.1cm)  LVIDd:           4.11 cm     (3.9-5.9cm)  LVIDs:           2.48 cm  LV Mass Index:   76.1 g/m2  LVEDV Index:     32.34 ml/m2  LV % FS          39.6 %       LEFT ATRIUM:                 Normal Ranges:  LA Vol A4C:       76.2 ml    (22+/-6mL/m2)  LA Vol A2C:       84.3 ml  LA Vol BP:        83.3 ml  LA Vol Index A4C: 38.5ml/m2  LA Vol Index A2C: 42.6 ml/m2  LA Vol Index BP:  42.1 ml/m2  LA Vol A4C:       72.3 ml  LA Vol A2C:       80.4 ml  LA Vol Index BSA: 38.6 ml/m2       LV SYSTOLIC FUNCTION:                       Normal Ranges:  EF-A4C View:    65 % (>=55%)  EF-A2C View:    44 %  EF-Biplane:     55 %  EF-Visual:      63 %  LV EF Reported: 63 %       LV DIASTOLIC FUNCTION:           Normal Ranges:  MV Peak E:             1.19 m/s  (0.7-1.2 m/s)  MV Peak A:             0.81 m/s  (0.42-0.7 m/s)  E/A Ratio:             1.48      (1.0-2.2)  MV e'                  0.067 m/s (>8.0)  MV lateral e'          0.08 m/s  MV medial e'           0.05 m/s  E/e' Ratio:            17.94     (<8.0)       MITRAL VALVE:          Normal Ranges:  MV Vmax:      1.28 m/s (<=1.3m/s)  MV peak P.5 mmHg (<5mmHg)  MV mean P.6 mmHg (<48mmHg)  MV VTI:       38.74 cm (10-13cm)  MV DT:        243 msec (150-240msec)       MITRAL INSUFFICIENCY:             Normal Ranges:  MR Vmax:              628.07 cm/s  dP/dt:                876 mmHg/s  (>1200mmHg/sec)       AORTIC VALVE:                     Normal Ranges:  AoV Vmax:                1.40 m/s  (<=1.7m/s)  AoV Peak P.9 mmHg (<20mmHg)  AoV Mean P.4 mmHg (1.7-11.5mmHg)  LVOT Max John:            0.84 m/s (<=1.1m/s)  AoV VTI:                 37.80 cm (18-25cm)  LVOT VTI:                24.05 cm  LVOT Diameter:           2.06 cm  (1.8-2.4cm)  AoV Area, VTI:           2.13 cm2 (2.5-5.5cm2)  AoV Area,Vmax:           1.99 cm2 (2.5-4.5cm2)  AoV Dimensionless Index: 0.64       RIGHT VENTRICLE:  RV Basal 2.77 cm  RV Mid   2.60 cm  RV Major 5.3 cm  RV s'    0.16 m/s       TRICUSPID VALVE/RVSP:          Normal Ranges:  Peak TR Velocity:     2.67 m/s  RV Syst Pressure:     31 mmHg  (< 30mmHg)  IVC Diam:             2.13 cm       PULMONIC VALVE:          Normal Ranges:  RVOT Vmax:      0.71 m/s (0.6-0.9m/s)       AORTA:  Asc Ao Diam 2.73 cm       [id number] Abraham Richter MD, [facility]  Electronically signed on 2025 at 6:52:27 PM         ** Final **         1:32 PM      Results were successfully communicated with the patient and they acknowledged their understanding.

## 2025-05-22 NOTE — TELEPHONE ENCOUNTER
----- Message from Alexia Lenz sent at 5/22/2025 11:52 AM EDT -----  Let her know that echocardiogram shows heart muscle function at 65%, there been no significant changes from prior testing.  ----- Message -----  From: Venus, [facility] - Cardiology Results In  Sent: 5/21/2025   6:52 PM EDT  To: COLETTE Conner-CNP

## 2025-12-16 ENCOUNTER — APPOINTMENT (OUTPATIENT)
Dept: CARDIOLOGY | Facility: CLINIC | Age: 85
End: 2025-12-16
Payer: MEDICARE

## 2025-12-30 ENCOUNTER — APPOINTMENT (OUTPATIENT)
Dept: CARDIOLOGY | Facility: CLINIC | Age: 85
End: 2025-12-30
Payer: MEDICARE